# Patient Record
Sex: FEMALE | Race: WHITE | Employment: OTHER | URBAN - METROPOLITAN AREA
[De-identification: names, ages, dates, MRNs, and addresses within clinical notes are randomized per-mention and may not be internally consistent; named-entity substitution may affect disease eponyms.]

---

## 2024-06-27 ENCOUNTER — HOSPITAL ENCOUNTER (INPATIENT)
Age: 69
LOS: 4 days | Discharge: HOME | DRG: 326 | End: 2024-07-02
Attending: EMERGENCY MEDICINE | Admitting: STUDENT IN AN ORGANIZED HEALTH CARE EDUCATION/TRAINING PROGRAM
Payer: MEDICARE

## 2024-06-27 ENCOUNTER — APPOINTMENT (OUTPATIENT)
Dept: GENERAL RADIOLOGY | Age: 69
DRG: 326 | End: 2024-06-27
Payer: MEDICARE

## 2024-06-27 ENCOUNTER — APPOINTMENT (OUTPATIENT)
Dept: CT IMAGING | Age: 69
DRG: 326 | End: 2024-06-27
Payer: MEDICARE

## 2024-06-27 DIAGNOSIS — K92.2 GASTROINTESTINAL HEMORRHAGE, UNSPECIFIED GASTROINTESTINAL HEMORRHAGE TYPE: ICD-10-CM

## 2024-06-27 DIAGNOSIS — R10.9 ABDOMINAL PAIN, UNSPECIFIED ABDOMINAL LOCATION: ICD-10-CM

## 2024-06-27 DIAGNOSIS — E87.20 METABOLIC ACIDOSIS: ICD-10-CM

## 2024-06-27 DIAGNOSIS — I48.91 ATRIAL FIBRILLATION, UNSPECIFIED TYPE (HCC): Primary | ICD-10-CM

## 2024-06-27 LAB
ALBUMIN SERPL-MCNC: 3.8 G/DL (ref 3.5–5.2)
ALP SERPL-CCNC: 50 U/L (ref 35–104)
ALT SERPL-CCNC: 12 U/L (ref 0–32)
ANION GAP SERPL CALCULATED.3IONS-SCNC: 16 MMOL/L (ref 7–16)
AST SERPL-CCNC: 14 U/L (ref 0–31)
BASOPHILS # BLD: 0.05 K/UL (ref 0–0.2)
BASOPHILS NFR BLD: 0 % (ref 0–2)
BILIRUB SERPL-MCNC: 0.3 MG/DL (ref 0–1.2)
BUN SERPL-MCNC: 63 MG/DL (ref 6–23)
CALCIUM SERPL-MCNC: 8.6 MG/DL (ref 8.6–10.2)
CHLORIDE SERPL-SCNC: 106 MMOL/L (ref 98–107)
CO2 SERPL-SCNC: 20 MMOL/L (ref 22–29)
CREAT SERPL-MCNC: 1.3 MG/DL (ref 0.5–1)
EOSINOPHIL # BLD: 0.01 K/UL (ref 0.05–0.5)
EOSINOPHILS RELATIVE PERCENT: 0 % (ref 0–6)
ERYTHROCYTE [DISTWIDTH] IN BLOOD BY AUTOMATED COUNT: 18.2 % (ref 11.5–15)
GFR, ESTIMATED: 43 ML/MIN/1.73M2
GLUCOSE BLD-MCNC: 121 MG/DL (ref 74–99)
GLUCOSE SERPL-MCNC: 123 MG/DL (ref 74–99)
HCT VFR BLD AUTO: 34.7 % (ref 34–48)
HGB BLD-MCNC: 10.1 G/DL (ref 11.5–15.5)
IMM GRANULOCYTES # BLD AUTO: 0.05 K/UL (ref 0–0.58)
IMM GRANULOCYTES NFR BLD: 0 % (ref 0–5)
INFLUENZA A BY PCR: NOT DETECTED
INFLUENZA B BY PCR: NOT DETECTED
LACTATE BLDV-SCNC: 1.6 MMOL/L (ref 0.5–1.9)
LACTATE BLDV-SCNC: 2.8 MMOL/L (ref 0.5–1.9)
LIPASE SERPL-CCNC: 14 U/L (ref 13–60)
LYMPHOCYTES NFR BLD: 1.62 K/UL (ref 1.5–4)
LYMPHOCYTES RELATIVE PERCENT: 14 % (ref 20–42)
MAGNESIUM SERPL-MCNC: 1.9 MG/DL (ref 1.6–2.6)
MCH RBC QN AUTO: 25.5 PG (ref 26–35)
MCHC RBC AUTO-ENTMCNC: 29.1 G/DL (ref 32–34.5)
MCV RBC AUTO: 87.6 FL (ref 80–99.9)
MONOCYTES NFR BLD: 0.59 K/UL (ref 0.1–0.95)
MONOCYTES NFR BLD: 5 % (ref 2–12)
NEUTROPHILS NFR BLD: 80 % (ref 43–80)
NEUTS SEG NFR BLD: 9.05 K/UL (ref 1.8–7.3)
PHOSPHATE SERPL-MCNC: 4 MG/DL (ref 2.5–4.5)
PLATELET # BLD AUTO: 329 K/UL (ref 130–450)
PMV BLD AUTO: 11.2 FL (ref 7–12)
POTASSIUM SERPL-SCNC: 4 MMOL/L (ref 3.5–5)
PROCALCITONIN SERPL-MCNC: 0.1 NG/ML (ref 0–0.08)
PROT SERPL-MCNC: 6 G/DL (ref 6.4–8.3)
RBC # BLD AUTO: 3.96 M/UL (ref 3.5–5.5)
SARS-COV-2 RDRP RESP QL NAA+PROBE: NOT DETECTED
SODIUM SERPL-SCNC: 142 MMOL/L (ref 132–146)
SPECIMEN DESCRIPTION: NORMAL
TROPONIN I SERPL HS-MCNC: 15 NG/L (ref 0–9)
TROPONIN I SERPL HS-MCNC: 27 NG/L (ref 0–9)
WBC OTHER # BLD: 11.4 K/UL (ref 4.5–11.5)

## 2024-06-27 PROCEDURE — 80053 COMPREHEN METABOLIC PANEL: CPT

## 2024-06-27 PROCEDURE — 84484 ASSAY OF TROPONIN QUANT: CPT

## 2024-06-27 PROCEDURE — 2580000003 HC RX 258

## 2024-06-27 PROCEDURE — 6360000002 HC RX W HCPCS

## 2024-06-27 PROCEDURE — 87502 INFLUENZA DNA AMP PROBE: CPT

## 2024-06-27 PROCEDURE — 96375 TX/PRO/DX INJ NEW DRUG ADDON: CPT

## 2024-06-27 PROCEDURE — APPSS45 APP SPLIT SHARED TIME 31-45 MINUTES

## 2024-06-27 PROCEDURE — 71045 X-RAY EXAM CHEST 1 VIEW: CPT

## 2024-06-27 PROCEDURE — 84100 ASSAY OF PHOSPHORUS: CPT

## 2024-06-27 PROCEDURE — 83735 ASSAY OF MAGNESIUM: CPT

## 2024-06-27 PROCEDURE — 87040 BLOOD CULTURE FOR BACTERIA: CPT

## 2024-06-27 PROCEDURE — 93005 ELECTROCARDIOGRAM TRACING: CPT

## 2024-06-27 PROCEDURE — 83690 ASSAY OF LIPASE: CPT

## 2024-06-27 PROCEDURE — 82962 GLUCOSE BLOOD TEST: CPT

## 2024-06-27 PROCEDURE — 85025 COMPLETE CBC W/AUTO DIFF WBC: CPT

## 2024-06-27 PROCEDURE — 99285 EMERGENCY DEPT VISIT HI MDM: CPT

## 2024-06-27 PROCEDURE — 96365 THER/PROPH/DIAG IV INF INIT: CPT

## 2024-06-27 PROCEDURE — 83605 ASSAY OF LACTIC ACID: CPT

## 2024-06-27 PROCEDURE — 74177 CT ABD & PELVIS W/CONTRAST: CPT

## 2024-06-27 PROCEDURE — 87635 SARS-COV-2 COVID-19 AMP PRB: CPT

## 2024-06-27 PROCEDURE — C9113 INJ PANTOPRAZOLE SODIUM, VIA: HCPCS

## 2024-06-27 PROCEDURE — 84145 PROCALCITONIN (PCT): CPT

## 2024-06-27 PROCEDURE — 2500000003 HC RX 250 WO HCPCS

## 2024-06-27 PROCEDURE — 6360000004 HC RX CONTRAST MEDICATION: Performed by: RADIOLOGY

## 2024-06-27 RX ORDER — DILTIAZEM HYDROCHLORIDE 5 MG/ML
0.25 INJECTION INTRAVENOUS ONCE
Status: DISCONTINUED | OUTPATIENT
Start: 2024-06-27 | End: 2024-06-27

## 2024-06-27 RX ORDER — DILTIAZEM HYDROCHLORIDE 5 MG/ML
10 INJECTION INTRAVENOUS ONCE
Status: COMPLETED | OUTPATIENT
Start: 2024-06-27 | End: 2024-06-27

## 2024-06-27 RX ORDER — PANTOPRAZOLE SODIUM 40 MG/10ML
80 INJECTION, POWDER, LYOPHILIZED, FOR SOLUTION INTRAVENOUS ONCE
Status: COMPLETED | OUTPATIENT
Start: 2024-06-27 | End: 2024-06-27

## 2024-06-27 RX ORDER — 0.9 % SODIUM CHLORIDE 0.9 %
30 INTRAVENOUS SOLUTION INTRAVENOUS ONCE
Status: COMPLETED | OUTPATIENT
Start: 2024-06-27 | End: 2024-06-27

## 2024-06-27 RX ADMIN — SODIUM CHLORIDE 2.5 MG/HR: 900 INJECTION, SOLUTION INTRAVENOUS at 22:22

## 2024-06-27 RX ADMIN — DILTIAZEM HYDROCHLORIDE 10 MG: 5 INJECTION, SOLUTION INTRAVENOUS at 22:20

## 2024-06-27 RX ADMIN — SODIUM CHLORIDE 1605 ML: 9 INJECTION, SOLUTION INTRAVENOUS at 20:38

## 2024-06-27 RX ADMIN — PIPERACILLIN AND TAZOBACTAM 4500 MG: 4; .5 INJECTION, POWDER, LYOPHILIZED, FOR SOLUTION INTRAVENOUS at 23:33

## 2024-06-27 RX ADMIN — IOPAMIDOL 75 ML: 755 INJECTION, SOLUTION INTRAVENOUS at 21:35

## 2024-06-27 RX ADMIN — PANTOPRAZOLE SODIUM 80 MG: 40 INJECTION, POWDER, FOR SOLUTION INTRAVENOUS at 20:48

## 2024-06-27 ASSESSMENT — LIFESTYLE VARIABLES
HOW OFTEN DO YOU HAVE A DRINK CONTAINING ALCOHOL: NEVER
HOW MANY STANDARD DRINKS CONTAINING ALCOHOL DO YOU HAVE ON A TYPICAL DAY: PATIENT DOES NOT DRINK

## 2024-06-27 ASSESSMENT — PAIN - FUNCTIONAL ASSESSMENT: PAIN_FUNCTIONAL_ASSESSMENT: NONE - DENIES PAIN

## 2024-06-28 ENCOUNTER — APPOINTMENT (OUTPATIENT)
Age: 69
DRG: 326 | End: 2024-06-28
Payer: MEDICARE

## 2024-06-28 ENCOUNTER — ANESTHESIA (OUTPATIENT)
Dept: ENDOSCOPY | Age: 69
End: 2024-06-28
Payer: COMMERCIAL

## 2024-06-28 ENCOUNTER — ANESTHESIA EVENT (OUTPATIENT)
Dept: ENDOSCOPY | Age: 69
End: 2024-06-28
Payer: COMMERCIAL

## 2024-06-28 PROBLEM — I48.91 ATRIAL FIBRILLATION WITH RAPID VENTRICULAR RESPONSE (HCC): Status: ACTIVE | Noted: 2024-06-28

## 2024-06-28 PROBLEM — K92.2 GASTROINTESTINAL HEMORRHAGE: Status: ACTIVE | Noted: 2024-06-28

## 2024-06-28 PROBLEM — I95.9 HYPOTENSION: Status: ACTIVE | Noted: 2024-06-28

## 2024-06-28 PROBLEM — D62 ACUTE BLOOD LOSS ANEMIA: Status: ACTIVE | Noted: 2024-06-28

## 2024-06-28 PROBLEM — E87.20 LACTIC ACIDOSIS: Status: ACTIVE | Noted: 2024-06-28

## 2024-06-28 LAB
ALBUMIN SERPL-MCNC: 3.4 G/DL (ref 3.5–5.2)
ALP SERPL-CCNC: 40 U/L (ref 35–104)
ALT SERPL-CCNC: 10 U/L (ref 0–32)
ANION GAP SERPL CALCULATED.3IONS-SCNC: 10 MMOL/L (ref 7–16)
AST SERPL-CCNC: 13 U/L (ref 0–31)
BACTERIA URNS QL MICRO: ABNORMAL
BILIRUB SERPL-MCNC: 0.2 MG/DL (ref 0–1.2)
BILIRUB UR QL STRIP: NEGATIVE
BUN SERPL-MCNC: 54 MG/DL (ref 6–23)
CA-I BLD-SCNC: 1.09 MMOL/L (ref 1.15–1.33)
CALCIUM SERPL-MCNC: 7.3 MG/DL (ref 8.6–10.2)
CHLORIDE SERPL-SCNC: 116 MMOL/L (ref 98–107)
CLARITY UR: CLEAR
CO2 SERPL-SCNC: 19 MMOL/L (ref 22–29)
COLOR UR: YELLOW
CREAT SERPL-MCNC: 1.1 MG/DL (ref 0.5–1)
ERYTHROCYTE [DISTWIDTH] IN BLOOD BY AUTOMATED COUNT: 18.4 % (ref 11.5–15)
GFR, ESTIMATED: 56 ML/MIN/1.73M2
GLUCOSE SERPL-MCNC: 120 MG/DL (ref 74–99)
GLUCOSE UR STRIP-MCNC: NEGATIVE MG/DL
HCT VFR BLD AUTO: 24.5 % (ref 34–48)
HCT VFR BLD AUTO: 25.4 % (ref 34–48)
HCT VFR BLD AUTO: 32.5 % (ref 34–48)
HGB BLD-MCNC: 10 G/DL (ref 11.5–15.5)
HGB BLD-MCNC: 7.2 G/DL (ref 11.5–15.5)
HGB BLD-MCNC: 7.5 G/DL (ref 11.5–15.5)
HGB BLD-MCNC: 8.9 G/DL (ref 11.5–15.5)
HGB UR QL STRIP.AUTO: ABNORMAL
INR PPP: 1.3
KETONES UR STRIP-MCNC: NEGATIVE MG/DL
LACTATE BLDV-SCNC: 1.7 MMOL/L (ref 0.5–2.2)
LEUKOCYTE ESTERASE UR QL STRIP: NEGATIVE
MAGNESIUM SERPL-MCNC: 1.5 MG/DL (ref 1.6–2.6)
MAGNESIUM SERPL-MCNC: 1.6 MG/DL (ref 1.6–2.6)
MAGNESIUM SERPL-MCNC: 2.1 MG/DL (ref 1.6–2.6)
MCH RBC QN AUTO: 25.5 PG (ref 26–35)
MCHC RBC AUTO-ENTMCNC: 29.4 G/DL (ref 32–34.5)
MCV RBC AUTO: 86.9 FL (ref 80–99.9)
NITRITE UR QL STRIP: NEGATIVE
PARTIAL THROMBOPLASTIN TIME: 19.5 SEC (ref 24.5–35.1)
PH UR STRIP: 5.5 [PH] (ref 5–9)
PHOSPHATE SERPL-MCNC: 2.7 MG/DL (ref 2.5–4.5)
PLATELET # BLD AUTO: 248 K/UL (ref 130–450)
PMV BLD AUTO: 11.6 FL (ref 7–12)
POTASSIUM SERPL-SCNC: 3.5 MMOL/L (ref 3.5–5)
POTASSIUM SERPL-SCNC: 4.6 MMOL/L (ref 3.5–5)
PROT SERPL-MCNC: 4.8 G/DL (ref 6.4–8.3)
PROT UR STRIP-MCNC: NEGATIVE MG/DL
PROTHROMBIN TIME: 13.9 SEC (ref 9.3–12.4)
RBC # BLD AUTO: 2.82 M/UL (ref 3.5–5.5)
RBC #/AREA URNS HPF: ABNORMAL /HPF
SODIUM SERPL-SCNC: 145 MMOL/L (ref 132–146)
SP GR UR STRIP: 1.01 (ref 1–1.03)
T4 FREE SERPL-MCNC: 1.2 NG/DL (ref 0.9–1.7)
TROPONIN I SERPL HS-MCNC: 26 NG/L (ref 0–9)
TSH SERPL DL<=0.05 MIU/L-ACNC: 0.37 UIU/ML (ref 0.27–4.2)
UROBILINOGEN UR STRIP-ACNC: 0.2 EU/DL (ref 0–1)
WBC #/AREA URNS HPF: ABNORMAL /HPF
WBC OTHER # BLD: 11.2 K/UL (ref 4.5–11.5)

## 2024-06-28 PROCEDURE — 85018 HEMOGLOBIN: CPT

## 2024-06-28 PROCEDURE — 6360000002 HC RX W HCPCS: Performed by: NURSE ANESTHETIST, CERTIFIED REGISTERED

## 2024-06-28 PROCEDURE — 2580000003 HC RX 258

## 2024-06-28 PROCEDURE — 3700000001 HC ADD 15 MINUTES (ANESTHESIA): Performed by: INTERNAL MEDICINE

## 2024-06-28 PROCEDURE — 84132 ASSAY OF SERUM POTASSIUM: CPT

## 2024-06-28 PROCEDURE — 84484 ASSAY OF TROPONIN QUANT: CPT

## 2024-06-28 PROCEDURE — 2580000003 HC RX 258: Performed by: NURSE ANESTHETIST, CERTIFIED REGISTERED

## 2024-06-28 PROCEDURE — 30233N1 TRANSFUSION OF NONAUTOLOGOUS RED BLOOD CELLS INTO PERIPHERAL VEIN, PERCUTANEOUS APPROACH: ICD-10-PCS | Performed by: INTERNAL MEDICINE

## 2024-06-28 PROCEDURE — 80053 COMPREHEN METABOLIC PANEL: CPT

## 2024-06-28 PROCEDURE — 6360000002 HC RX W HCPCS

## 2024-06-28 PROCEDURE — 85027 COMPLETE CBC AUTOMATED: CPT

## 2024-06-28 PROCEDURE — 2720000010 HC SURG SUPPLY STERILE: Performed by: INTERNAL MEDICINE

## 2024-06-28 PROCEDURE — 86901 BLOOD TYPING SEROLOGIC RH(D): CPT

## 2024-06-28 PROCEDURE — 86850 RBC ANTIBODY SCREEN: CPT

## 2024-06-28 PROCEDURE — 85610 PROTHROMBIN TIME: CPT

## 2024-06-28 PROCEDURE — 36415 COLL VENOUS BLD VENIPUNCTURE: CPT

## 2024-06-28 PROCEDURE — 84443 ASSAY THYROID STIM HORMONE: CPT

## 2024-06-28 PROCEDURE — 2580000003 HC RX 258: Performed by: HOSPITALIST

## 2024-06-28 PROCEDURE — 3E0G8GC INTRODUCTION OF OTHER THERAPEUTIC SUBSTANCE INTO UPPER GI, VIA NATURAL OR ARTIFICIAL OPENING ENDOSCOPIC: ICD-10-PCS | Performed by: INTERNAL MEDICINE

## 2024-06-28 PROCEDURE — 85730 THROMBOPLASTIN TIME PARTIAL: CPT

## 2024-06-28 PROCEDURE — 3609013000 HC EGD TRANSORAL CONTROL BLEEDING ANY METHOD: Performed by: INTERNAL MEDICINE

## 2024-06-28 PROCEDURE — 6360000002 HC RX W HCPCS: Performed by: HOSPITALIST

## 2024-06-28 PROCEDURE — 83735 ASSAY OF MAGNESIUM: CPT

## 2024-06-28 PROCEDURE — 6370000000 HC RX 637 (ALT 250 FOR IP)

## 2024-06-28 PROCEDURE — 2709999900 HC NON-CHARGEABLE SUPPLY: Performed by: INTERNAL MEDICINE

## 2024-06-28 PROCEDURE — 83605 ASSAY OF LACTIC ACID: CPT

## 2024-06-28 PROCEDURE — 6360000002 HC RX W HCPCS: Performed by: INTERNAL MEDICINE

## 2024-06-28 PROCEDURE — 0W3P8ZZ CONTROL BLEEDING IN GASTROINTESTINAL TRACT, VIA NATURAL OR ARTIFICIAL OPENING ENDOSCOPIC: ICD-10-PCS | Performed by: INTERNAL MEDICINE

## 2024-06-28 PROCEDURE — 85014 HEMATOCRIT: CPT

## 2024-06-28 PROCEDURE — 36430 TRANSFUSION BLD/BLD COMPNT: CPT

## 2024-06-28 PROCEDURE — C9113 INJ PANTOPRAZOLE SODIUM, VIA: HCPCS | Performed by: HOSPITALIST

## 2024-06-28 PROCEDURE — 0D968ZZ DRAINAGE OF STOMACH, VIA NATURAL OR ARTIFICIAL OPENING ENDOSCOPIC: ICD-10-PCS | Performed by: INTERNAL MEDICINE

## 2024-06-28 PROCEDURE — 2000000000 HC ICU R&B

## 2024-06-28 PROCEDURE — 87081 CULTURE SCREEN ONLY: CPT

## 2024-06-28 PROCEDURE — 81001 URINALYSIS AUTO W/SCOPE: CPT

## 2024-06-28 PROCEDURE — 86900 BLOOD TYPING SEROLOGIC ABO: CPT

## 2024-06-28 PROCEDURE — 86923 COMPATIBILITY TEST ELECTRIC: CPT

## 2024-06-28 PROCEDURE — 2500000003 HC RX 250 WO HCPCS

## 2024-06-28 PROCEDURE — 99291 CRITICAL CARE FIRST HOUR: CPT | Performed by: STUDENT IN AN ORGANIZED HEALTH CARE EDUCATION/TRAINING PROGRAM

## 2024-06-28 PROCEDURE — 84439 ASSAY OF FREE THYROXINE: CPT

## 2024-06-28 PROCEDURE — 84100 ASSAY OF PHOSPHORUS: CPT

## 2024-06-28 PROCEDURE — P9016 RBC LEUKOCYTES REDUCED: HCPCS

## 2024-06-28 PROCEDURE — 82330 ASSAY OF CALCIUM: CPT

## 2024-06-28 PROCEDURE — 3700000000 HC ANESTHESIA ATTENDED CARE: Performed by: INTERNAL MEDICINE

## 2024-06-28 RX ORDER — PROCHLORPERAZINE EDISYLATE 5 MG/ML
10 INJECTION INTRAMUSCULAR; INTRAVENOUS ONCE
Status: COMPLETED | OUTPATIENT
Start: 2024-06-28 | End: 2024-06-28

## 2024-06-28 RX ORDER — PROPOFOL 10 MG/ML
INJECTION, EMULSION INTRAVENOUS PRN
Status: DISCONTINUED | OUTPATIENT
Start: 2024-06-28 | End: 2024-06-28 | Stop reason: SDUPTHER

## 2024-06-28 RX ORDER — SODIUM CHLORIDE 0.9 % (FLUSH) 0.9 %
5-40 SYRINGE (ML) INJECTION EVERY 12 HOURS SCHEDULED
Status: DISCONTINUED | OUTPATIENT
Start: 2024-06-28 | End: 2024-07-02 | Stop reason: HOSPADM

## 2024-06-28 RX ORDER — SODIUM CHLORIDE 9 MG/ML
INJECTION, SOLUTION INTRAVENOUS CONTINUOUS
Status: DISCONTINUED | OUTPATIENT
Start: 2024-06-28 | End: 2024-06-28

## 2024-06-28 RX ORDER — ONDANSETRON 2 MG/ML
4 INJECTION INTRAMUSCULAR; INTRAVENOUS EVERY 6 HOURS PRN
Status: DISCONTINUED | OUTPATIENT
Start: 2024-06-28 | End: 2024-07-02 | Stop reason: HOSPADM

## 2024-06-28 RX ORDER — 0.9 % SODIUM CHLORIDE 0.9 %
1000 INTRAVENOUS SOLUTION INTRAVENOUS ONCE
Status: COMPLETED | OUTPATIENT
Start: 2024-06-28 | End: 2024-06-28

## 2024-06-28 RX ORDER — LORAZEPAM 2 MG/ML
0.5 INJECTION INTRAMUSCULAR EVERY 6 HOURS PRN
Status: DISCONTINUED | OUTPATIENT
Start: 2024-06-28 | End: 2024-06-28

## 2024-06-28 RX ORDER — SODIUM CHLORIDE 9 MG/ML
INJECTION, SOLUTION INTRAVENOUS PRN
Status: DISCONTINUED | OUTPATIENT
Start: 2024-06-28 | End: 2024-06-29

## 2024-06-28 RX ORDER — SODIUM CHLORIDE 9 MG/ML
INJECTION, SOLUTION INTRAVENOUS CONTINUOUS PRN
Status: DISCONTINUED | OUTPATIENT
Start: 2024-06-28 | End: 2024-06-28 | Stop reason: SDUPTHER

## 2024-06-28 RX ORDER — NICOTINE 21 MG/24HR
1 PATCH, TRANSDERMAL 24 HOURS TRANSDERMAL DAILY
Status: DISCONTINUED | OUTPATIENT
Start: 2024-06-28 | End: 2024-07-02 | Stop reason: HOSPADM

## 2024-06-28 RX ORDER — FERROUS SULFATE 325(65) MG
325 TABLET ORAL
COMMUNITY

## 2024-06-28 RX ORDER — SODIUM CHLORIDE 9 MG/ML
INJECTION, SOLUTION INTRAVENOUS PRN
Status: DISCONTINUED | OUTPATIENT
Start: 2024-06-28 | End: 2024-07-02 | Stop reason: HOSPADM

## 2024-06-28 RX ORDER — SODIUM CHLORIDE 0.9 % (FLUSH) 0.9 %
5-40 SYRINGE (ML) INJECTION PRN
Status: DISCONTINUED | OUTPATIENT
Start: 2024-06-28 | End: 2024-07-02 | Stop reason: HOSPADM

## 2024-06-28 RX ORDER — SODIUM CHLORIDE, SODIUM LACTATE, POTASSIUM CHLORIDE, CALCIUM CHLORIDE 600; 310; 30; 20 MG/100ML; MG/100ML; MG/100ML; MG/100ML
INJECTION, SOLUTION INTRAVENOUS CONTINUOUS
Status: DISCONTINUED | OUTPATIENT
Start: 2024-06-28 | End: 2024-07-02

## 2024-06-28 RX ORDER — MAGNESIUM SULFATE IN WATER 40 MG/ML
2000 INJECTION, SOLUTION INTRAVENOUS ONCE
Status: COMPLETED | OUTPATIENT
Start: 2024-06-28 | End: 2024-06-28

## 2024-06-28 RX ORDER — POTASSIUM CHLORIDE 20 MEQ/1
40 TABLET, EXTENDED RELEASE ORAL ONCE
Status: COMPLETED | OUTPATIENT
Start: 2024-06-28 | End: 2024-06-28

## 2024-06-28 RX ORDER — PANTOPRAZOLE SODIUM 40 MG/1
40 TABLET, DELAYED RELEASE ORAL 2 TIMES DAILY
Status: ON HOLD | COMMUNITY
End: 2024-06-30 | Stop reason: HOSPADM

## 2024-06-28 RX ORDER — SERTRALINE HYDROCHLORIDE 100 MG/1
100 TABLET, FILM COATED ORAL DAILY
COMMUNITY

## 2024-06-28 RX ORDER — ACETAMINOPHEN 650 MG/1
650 SUPPOSITORY RECTAL EVERY 6 HOURS PRN
Status: DISCONTINUED | OUTPATIENT
Start: 2024-06-28 | End: 2024-07-02 | Stop reason: HOSPADM

## 2024-06-28 RX ORDER — ONDANSETRON 4 MG/1
4 TABLET, ORALLY DISINTEGRATING ORAL EVERY 8 HOURS PRN
COMMUNITY

## 2024-06-28 RX ORDER — DIGOXIN 0.25 MG/ML
125 INJECTION INTRAMUSCULAR; INTRAVENOUS DAILY
Status: DISCONTINUED | OUTPATIENT
Start: 2024-06-28 | End: 2024-06-28

## 2024-06-28 RX ORDER — PROCHLORPERAZINE EDISYLATE 5 MG/ML
INJECTION INTRAMUSCULAR; INTRAVENOUS
Status: COMPLETED
Start: 2024-06-28 | End: 2024-06-28

## 2024-06-28 RX ORDER — EPINEPHRINE 1 MG/ML
INJECTION, SOLUTION, CONCENTRATE INTRAVENOUS PRN
Status: DISCONTINUED | OUTPATIENT
Start: 2024-06-28 | End: 2024-06-28 | Stop reason: ALTCHOICE

## 2024-06-28 RX ORDER — ONDANSETRON 4 MG/1
4 TABLET, ORALLY DISINTEGRATING ORAL EVERY 8 HOURS PRN
Status: DISCONTINUED | OUTPATIENT
Start: 2024-06-28 | End: 2024-07-02 | Stop reason: HOSPADM

## 2024-06-28 RX ORDER — ACETAMINOPHEN 325 MG/1
650 TABLET ORAL EVERY 6 HOURS PRN
Status: DISCONTINUED | OUTPATIENT
Start: 2024-06-28 | End: 2024-07-02 | Stop reason: HOSPADM

## 2024-06-28 RX ORDER — LORAZEPAM 2 MG/ML
1 INJECTION INTRAMUSCULAR EVERY 6 HOURS PRN
Status: DISPENSED | OUTPATIENT
Start: 2024-06-28 | End: 2024-06-29

## 2024-06-28 RX ORDER — POTASSIUM CHLORIDE 7.45 MG/ML
10 INJECTION INTRAVENOUS ONCE
Status: COMPLETED | OUTPATIENT
Start: 2024-06-28 | End: 2024-06-28

## 2024-06-28 RX ORDER — CALCIUM GLUCONATE 20 MG/ML
1000 INJECTION, SOLUTION INTRAVENOUS ONCE
Status: COMPLETED | OUTPATIENT
Start: 2024-06-28 | End: 2024-06-28

## 2024-06-28 RX ADMIN — SODIUM CHLORIDE: 9 INJECTION, SOLUTION INTRAVENOUS at 14:17

## 2024-06-28 RX ADMIN — LORAZEPAM 1 MG: 2 INJECTION INTRAMUSCULAR; INTRAVENOUS at 10:09

## 2024-06-28 RX ADMIN — PHENYLEPHRINE HYDROCHLORIDE 100 MCG: 10 INJECTION INTRAVENOUS at 14:33

## 2024-06-28 RX ADMIN — PIPERACILLIN AND TAZOBACTAM 3375 MG: 3; .375 INJECTION, POWDER, LYOPHILIZED, FOR SOLUTION INTRAVENOUS at 09:57

## 2024-06-28 RX ADMIN — SODIUM CHLORIDE, POTASSIUM CHLORIDE, SODIUM LACTATE AND CALCIUM CHLORIDE: 600; 310; 30; 20 INJECTION, SOLUTION INTRAVENOUS at 05:17

## 2024-06-28 RX ADMIN — SODIUM CHLORIDE, PRESERVATIVE FREE 40 MG: 5 INJECTION INTRAVENOUS at 14:13

## 2024-06-28 RX ADMIN — AMIODARONE HYDROCHLORIDE 150 MG: 50 INJECTION, SOLUTION INTRAVENOUS at 01:38

## 2024-06-28 RX ADMIN — AMIODARONE HYDROCHLORIDE 0.5 MG/MIN: 50 INJECTION, SOLUTION INTRAVENOUS at 07:34

## 2024-06-28 RX ADMIN — SODIUM CHLORIDE, PRESERVATIVE FREE 40 MG: 5 INJECTION INTRAVENOUS at 09:06

## 2024-06-28 RX ADMIN — POTASSIUM CHLORIDE 40 MEQ: 1500 TABLET, EXTENDED RELEASE ORAL at 05:36

## 2024-06-28 RX ADMIN — PROCHLORPERAZINE EDISYLATE 10 MG: 5 INJECTION INTRAMUSCULAR; INTRAVENOUS at 07:52

## 2024-06-28 RX ADMIN — CALCIUM GLUCONATE 1000 MG: 20 INJECTION, SOLUTION INTRAVENOUS at 05:26

## 2024-06-28 RX ADMIN — MAGNESIUM SULFATE HEPTAHYDRATE 2000 MG: 40 INJECTION, SOLUTION INTRAVENOUS at 05:22

## 2024-06-28 RX ADMIN — POTASSIUM CHLORIDE 10 MEQ: 7.46 INJECTION, SOLUTION INTRAVENOUS at 05:20

## 2024-06-28 RX ADMIN — SODIUM CHLORIDE, POTASSIUM CHLORIDE, SODIUM LACTATE AND CALCIUM CHLORIDE: 600; 310; 30; 20 INJECTION, SOLUTION INTRAVENOUS at 15:58

## 2024-06-28 RX ADMIN — SODIUM CHLORIDE, PRESERVATIVE FREE 40 MG: 5 INJECTION INTRAVENOUS at 19:40

## 2024-06-28 RX ADMIN — AMIODARONE HYDROCHLORIDE 1 MG/MIN: 50 INJECTION, SOLUTION INTRAVENOUS at 02:06

## 2024-06-28 RX ADMIN — PROPOFOL 300 MG: 10 INJECTION, EMULSION INTRAVENOUS at 14:31

## 2024-06-28 RX ADMIN — SODIUM CHLORIDE 1000 ML: 9 INJECTION, SOLUTION INTRAVENOUS at 01:39

## 2024-06-28 RX ADMIN — AMIODARONE HYDROCHLORIDE 0.5 MG/MIN: 50 INJECTION, SOLUTION INTRAVENOUS at 21:01

## 2024-06-28 RX ADMIN — SODIUM CHLORIDE: 9 INJECTION, SOLUTION INTRAVENOUS at 01:38

## 2024-06-28 RX ADMIN — ONDANSETRON 4 MG: 2 INJECTION INTRAMUSCULAR; INTRAVENOUS at 05:27

## 2024-06-28 RX ADMIN — LORAZEPAM 0.5 MG: 2 INJECTION INTRAMUSCULAR; INTRAVENOUS at 09:20

## 2024-06-28 ASSESSMENT — PAIN SCALES - GENERAL
PAINLEVEL_OUTOF10: 0

## 2024-06-28 ASSESSMENT — LIFESTYLE VARIABLES: SMOKING_STATUS: 1

## 2024-06-28 NOTE — ED PROVIDER NOTES
[facility] EMERGENCY DEPARTMENT  EMERGENCY DEPARTMENT ENCOUNTER      Pt Name: Lizette Menendez  MRN: [id number]  Birthdate 1955  Date of evaluation: 6/27/2024  Provider: Gianluca Avilez MD  PCP: Dann Prescott MD  Note Started: 1:05 AM EDT 6/28/24    CHIEF COMPLAINT       Chief Complaint   Patient presents with    Abdominal Pain     Had ulcers cauterized yesterday. C/o abd pain with n/v and blood in stools.     Fatigue       HISTORY OF PRESENT ILLNESS: 1 or more Elements   History From: Patient  Limitations to history : None    Lizette Menendez is a 69 y.o. female who presents with complaints of fatigue, generalized weakness and complaints of generalized abdominal pain associated with reports of hematemesis and loose bloody stools.  Per patient, she is status post endoscopy yesterday as outpatient with Dr. Corley.  She reports she had bleeding ulcers cauterized yesterday.  There is currently no information in the patient's chart to provide collateral history.  Patient reports that upon returning home she has had intermittent episodes of bloody emesis and bloody stools.  She reports she just feels weak.  Currently denies fevers, chills, chest pain or pressure, shortness of breath, dizziness, weakness, numbness, tingling in extremities, dysuria.    Nursing Notes were all reviewed and agreed with or any disagreements were addressed in the HPI.    REVIEW OF SYSTEMS :    Positives and Pertinent negatives as per HPI.     PAST MEDICAL HISTORY/Chronic Conditions Affecting Care    has no past medical history on file.     SURGICAL HISTORY   No past surgical history on file.    CURRENTMEDICATIONS       Previous Medications    No medications on file       ALLERGIES     Patient has no known allergies.    FAMILYHISTORY     No family history on file.     SOCIAL HISTORY          SCREENINGS        [place] Coma Scale  Eye Opening: Spontaneous  Best Verbal Response: Oriented  Best Motor Response:

## 2024-06-28 NOTE — H&P
[facility] History and Physical      CHIEF COMPLAINT: Hematemesis    History of Present Illness:  This is a 69-year-old female with past medical history of depression and PUD who presents to the ED with generalized abdominal pain and hematemesis.  Patient states she had an outpatient upper endoscopy done on Wednesday with Dr. Corley, at which time she had bleeding ulcers cauterized.  After discharge she states she has had multiple episodes of bloody emesis and black tarry stools.  Complains of generalized abdominal pain and fatigue.  Denies shortness of breath or chest pain. Denies anticoagulation.      GI was consulted in ED and the case was discussed by the ED physician.  Patient was treated with 80 mg IV Protonix and Zosyn 450 mg IV.  She also received 30 ml/kg NS bolus.  Patient became tachycardic while in ED and was noted to be in atrial fibrillation. This is new onset as patient denies any history of atrial fibrillation.  Patient was given 10 mg Cardizem bolus and started on Cardizem drip. Cardizem was titrated to 5 mg/hour, but patient became hypotensive and heart rate was not controlled at 130-160s.  Hemoglobin noted to be trending down to 7.5 from 10.1 on admission. Stat type and screen and 2 units PRBCs ordered.  Cardizem discontinued and amiodarone bolus and infusion ordered.  Critical care was consulted and case discussed by ED resident.  Patient will be admitted for further evaluation and treatment to intensive care unit.      Informant(s) for H&P: Patient and chart review    REVIEW OF SYSTEMS:  A comprehensive review of systems was negative except for: what is in the HPI      PMH:  No past medical history on file.    Surgical History:  No past surgical history on file.    Medications Prior to Admission:    Prior to Admission medications    Not on File       Allergies:    Patient has no known allergies.    Social History:        Family History:   family history is not on file.  discontinued due to hypotension.  Start amiodarone drip.  Cardiology consulted.  Echo in a.m.  Avoid anticoagulation due to GI bleed.  Acute anemia: Secondary to GI bleed.  Complains of melena and hematemesis.  S/p upper endoscopy on 6/26 with Dr. Corley.  GI consulted in ED.  Transfuse for hemoglobin < 7, 2 units PRBCs ordered for transfusion.  Protonix 40 mg IV every 12 hours.  Keep n.p.o.  Will continue Zosyn due to recent instrumentation.  Lactic acidosis: Lactic acid on admission was 2.8.  Received 30 mL/kg bolus and repeat lactic acid was 1.6.  Continue IV hydration resolved.  Depression: Hold Zoloft.    Code Status: Full  DVT prophylaxis: SCDs    45 minutes or more spent reviewing patient chart, assessing patient, discussing plan of care with patient and family, discussing plan of care with collaborating physician, and documentation.    NOTE: This report was transcribed using voice recognition software. Every effort was made to ensure accuracy; however, inadvertent computerized transcription errors may be present.  Electronically signed by IRMA Masters CNP on 6/28/2024 at 1:52 AM

## 2024-06-28 NOTE — PATIENT CARE CONFERENCE
Patient admitted from ER 22 to room 207, placed on monitor, patient oriented to room and unit visiting hours.  Patient guide at bedside, reviewed patient rights and responsibilities. MRSA nasal swab obtained. Bed alarm on, call light within reach.     Cell phone, ankle jewelry, thumb ring, , undergarments, pants and shirt at bedside.

## 2024-06-28 NOTE — ED NOTES
Please contact for updates/questions/concerns:  Alfredo (Son): [phone]  Anastacia: [phone]

## 2024-06-28 NOTE — CONSULTS
CONSULT  Allan Hoffman M.D.  [facility]  Dr. Flaquita Hernandez M.D.,  Dr. Carlos Devlin M.D.,  Dr. Morris Corley, D.O.,  Dr. Andre Eisenberg D.O. ,  Dr. Davis Sloan M.D.,      Lizette Menendez  69 y.o.  female      Re:\"+FOBT, reported bloody emesis, s/p endoscopy yesterday\"  Requesting physician: Dr. Avilez, resident emergency department  Date:1:41 PM 6/28/2024      HPI: 69-year-old female patient seen in the hospital for above described issue.  Patient underwent upper endoscopy on Wednesday in our office.  Patient is very somnolent when I came to see her but her son is present at bedside and provides most of the information with additional information taken from medical records and discussion with healthcare team.  Apparently patient came to his house and stayed for some time recovering from the procedure.  Apparently patient had episode of nausea vomiting-according to the son patient had some blood in the emesis when she told him about.  Apparently patient went home and continued to have episodes of emesis and dark stool.  According to the son he is not aware if anybody was called with this information.  Apparently patient's daughter went to check on her yesterday and found her to be very fatigued prompting her to take her to the emergency department.  Patient was brought to the emergency department yesterday evening and found to be anemic with hemoglobin of 10.1 decreasing to 7.2 earlier today.  Patient is receiving blood transfusion today with repeat hemoglobin pending.    Information sources:   -Patient (limited/unobtainable)  -family (son at bedside)  -medical record  -health care team    PMHx:No past medical history on file.    PSHx:No past surgical history on file.    Meds:  Current Facility-Administered Medications   Medication Dose Route Frequency Provider Last Rate Last Admin    amiodarone (CORDARONE) 450 mg in dextrose 5 % 250 mL infusion  0.5 mg/min IntraVENous Continuous Marium Strange,  (ATIVAN) injection 1 mg  1 mg IntraVENous Q6H PRN Larry Gamez, DO   1 mg at 06/28/24 1009        SocHx:  Social History     Socioeconomic History    Marital status: Single     Spouse name: Not on file    Number of children: Not on file    Years of education: Not on file    Highest education level: Not on file   Occupational History    Not on file   Tobacco Use    Smoking status: Every Day     Current packs/day: 0.50     Average packs/day: 0.5 packs/day for 50.1 years (25.0 ttl pk-yrs)     Types: Cigarettes     Start date: 6/1/1974     Passive exposure: Current    Smokeless tobacco: Never   Substance and Sexual Activity    Alcohol use: Never    Drug use: Never    Sexual activity: Not on file   Other Topics Concern    Not on file   Social History Narrative    Not on file     Social Determinants of Health     Financial Resource Strain: Not on file   Food Insecurity: No Food Insecurity (6/28/2024)    Hunger Vital Sign     Worried About Running Out of Food in the Last Year: Never true     Ran Out of Food in the Last Year: Never true   Transportation Needs: No Transportation Needs (6/28/2024)    PRAPARE - Transportation     Lack of Transportation (Medical): No     Lack of Transportation (Non-Medical): No   Physical Activity: Not on file   Stress: Not on file   Social Connections: Not on file   Intimate Partner Violence: Not on file   Housing Stability: Low Risk  (6/28/2024)    Housing Stability Vital Sign     Unable to Pay for Housing in the Last Year: No     Number of Places Lived in the Last Year: 1     Unstable Housing in the Last Year: No       FamHx:No family history on file.    Allergy:No Known Allergies      ROS: As described in the HPI and in addition is negative upon detailed review of systems or unobtainable unless otherwise stated in this dictation.    PE:  BP (!) 140/66   Pulse 91   Temp 97.9 °F (36.6 °C) (Oral)   Resp 18   Ht 1.626 m (5' 4\")   Wt 53.5 kg (118 lb)   SpO2 100%   BMI 20.25 kg/m²      Gen.: NAD/adult  female.  Very somnolent/obtunded  Head: Atraumatic/normocephalic  Eyes: Anicteric sclera/no conjunctival erythema  ENT: Moist oral mucosa.  No discharge from nose or ears  Neck: Trachea midline/no JVD  Chest: CTAB/symmetric excursions  Cor: Regular/S1/S2  Abd.: Soft and not distended  Extr.:  No peripheral edema  Muscles: Tone and bulk, consistent with age and condition  Skin: Warm and dry.  Anicteric      DATA:     Lab Results   Component Value Date/Time    WBC 11.2 06/28/2024 03:44 AM    RBC 2.82 06/28/2024 03:44 AM    HGB 7.2 06/28/2024 03:44 AM    HCT 24.5 06/28/2024 03:44 AM    MCV 86.9 06/28/2024 03:44 AM    MCH 25.5 06/28/2024 03:44 AM    MCHC 29.4 06/28/2024 03:44 AM    RDW 18.4 06/28/2024 03:44 AM     06/28/2024 03:44 AM    MPV 11.6 06/28/2024 03:44 AM     Lab Results   Component Value Date/Time     06/28/2024 03:44 AM    K 4.6 06/28/2024 11:05 AM     06/28/2024 03:44 AM    CO2 19 06/28/2024 03:44 AM    BUN 54 06/28/2024 03:44 AM    CREATININE 1.1 06/28/2024 03:44 AM    CALCIUM 7.3 06/28/2024 03:44 AM    BILITOT 0.2 06/28/2024 03:44 AM    ALKPHOS 40 06/28/2024 03:44 AM    AST 13 06/28/2024 03:44 AM    ALT 10 06/28/2024 03:44 AM     Lab Results   Component Value Date/Time    LIPASE 14 06/27/2024 08:30 PM     No results found for: \"AMYLASE\"      ASSESSMENT/PLAN:  Patient Active Problem List   Diagnosis    Atrial fibrillation with rapid ventricular response (HCC)    Hypotension    Acute blood loss anemia    Lactic acidosis    Gastrointestinal hemorrhage     1.  Anemia/GI bleed  -Recent EGD with findings of gastric ulcer/AVM  -Bleeding starting shortly after procedure however patient/family did not seek medical attention until yesterday evening  -High-dose IV PPI  -Elevate head of bed; oxygen nasal cannula  -Type cross and hold 2 units PRBC  -Plan for further evaluation/treatment with upper endoscopy later today.    2.  Lung nodule  -Cannot exclude malignancy

## 2024-06-28 NOTE — ANESTHESIA POSTPROCEDURE EVALUATION
Department of Anesthesiology  Postprocedure Note    Patient: Lizette Menendez  MRN: [id number]  YOB: 1955  Date of evaluation: 6/28/2024    Procedure Summary       Date: 06/28/24 Room / Location: [facility]    Anesthesia Start: 1425 Anesthesia Stop: 1505    Procedure: ESOPHAGOGASTRODUODENOSCOPY Diagnosis:       Gastrointestinal hemorrhage, unspecified gastrointestinal hemorrhage type      (Gastrointestinal hemorrhage, unspecified gastrointestinal hemorrhage type [K92.2])    Surgeons: Morris Corley DO Responsible Provider: Angelica Beaulieu DO    Anesthesia Type: MAC ASA Status: 4            Anesthesia Type: No value filed.    Antonino Phase I:      Antonino Phase II:      Anesthesia Post Evaluation    Patient location during evaluation: ICU  Patient participation: complete - patient cannot participate  Level of consciousness: awake and obtunded/minimal responses  Airway patency: patent  Nausea & Vomiting: no nausea and no vomiting  Cardiovascular status: hemodynamically stable  Respiratory status: acceptable  Hydration status: euvolemic  Pain management: adequate        No notable events documented.

## 2024-06-28 NOTE — ED NOTES
ED to Inpatient Handoff Report    Notified Paddy that electronic handoff available and patient ready for transport to room 207.    Safety Risks: None identified    Patient in Restraints: no    Constant Observer or Patient : no    Telemetry Monitoring Ordered: Yes          Order to transfer to unit without monitor: NO    Last MEWS:  Time completed:     Deterioration Index: 33.1    Vitals:    06/28/24 0112 06/28/24 0119 06/28/24 0135 06/28/24 0145   BP:  (!) 88/52 (!) 93/58 105/71   Pulse: (!) 136 (!) 143 96 (!) 103   Resp:  22 18 11   Temp:       TempSrc:       SpO2:  96% 96% 99%   Weight:       Height:           Opportunity for questions and clarification was provided.

## 2024-06-28 NOTE — CONSULTS
Critical Care Admit/Consult Note     Patient - Lizette Menendez   MRN -  [id number]   [facility] # - [id number]   - 1955      Date of Admission -  2024  7:42 PM  Date of evaluation -  2024   Hospital Day - 0    Assessment and Plan  Ms. Lizette Menendez is a 69 y.o. female with the following medical problems:     Acute drop in hemoglobin, concerns for GI Bleed  S/p EGD with cauterization to ulcers on  with Dr. Corley  Atrial fibrillation with RVR, new onset  Hypotension, medication induced 2/2 Cardizem and hypovolemia (GI bleeding)  YANELIS?,no baseline creatinine, most likely volume responsive prerenal YANELIS  Lactic acidosis, resolved  Hyperchloremic metabolic acidosis  Lung nodule, right mid lobe  ---------------------------------------------------------------------------------------------------------  GI consulted, appreciate recommendations  Continue to trend H&H  Continue amiodarone infusion  Cardiology consulted, appreciate recommendations  Transfuse for hemoglobin <7  PPI BID  Continue with IVF, change to LR  Echo ordered by primary  Pan cultures pending, on zosyn per primary for concerns of intra abdominal infection, follow cultures and de-escalate antibiotics when appropriate  Continue to monitor renal function  Diet: NPO for now  GI prophylaxis: PPI  DVT prophylaxis: SCD's, no anticoagulation due to GI bleeding    History of Present Illness: Ms. Lizette Menendez is a 69 year old who was admitted to [facility] ICU on 2024 after presenting to the ED with reports of abdominal pain, nausea vomiting, blood in stool. The patient reports to having an EGD with Dr. Corley on Wednesday (). The patient reports to having bloody stools while at home and generalized weakness. While being evaluated in the ED, it was noted that she had an acute drop in hemoglobin 10.1>>7.2 and appears pale.  Other labs pertinent to this admission include: Creatinine 1.3, BUN 63, lactic acid 2.8, UA with trace bacteria.The  chills, night sweats.  HEENT: denies headaches, dizziness, head trauma, visual changes, eye pain, tinnitus, nosebleeds, hoarseness or throat pain    Respiratory: denies chest pain, dyspnea, cough and hemoptysis  Cardiovascular: denies orthopnea, paroxysmal nocturnal dyspnea, leg swelling, and previous heart attack.    Gastrointestinal: Reports melena, nausea and vomiting.  Genitourinary: denies hematuria, frequency, urgency or dysuria  Neurology: denies syncope, seizures, paralysis, paraesthesia   Endocrine: denies polyuria, polydipsia, skin or hair changes, and heat or cold intolerance  Musculoskeletal: Reports fatigue and generalized weakness  Hematologic: denies bleeding, adenopathy and easy bruising  Skin: denies rashes and skin discoloration  Psychiatry: denies depression    Physical Exam:   Vital Signs:  /71   Pulse 81   Temp 97.8 °F (36.6 °C) (Oral)   Resp 17   Ht 1.626 m (5' 4\")   Wt 53.5 kg (118 lb)   SpO2 100%   BMI 20.25 kg/m²     Input/Output:  No intake/output data recorded.    Oxygen requirements: room air         General appearance: Toxic appearing, not in pain or distress, in no respiratory distress    HEENT: Atraumatic/normocephalic, EOMI, ESTRELLA, pharynx clear, pale mucosa  Neck: Supple, no jugular venous distension, lymphadenopathy, thyromegaly or carotid bruits  Chest: Equal normal breath sounds, no wheezing, no crackles and no tenderness over ribs   Cardiovascular: Normal S1 , S2, irregular rate and rhythm, no murmur, rub or gallop  Abdomen: Normal sounds present, soft, lax with tenderness  Extremities: No edema. Pulses are equally present.   Skin: intact, no rashes   Neurologic: Alert and oriented x 3, No focal deficit, moving all extremities     Investigations:  Labs, radiological imaging and cardiac work up were personally reviewed      Case and plan was discussed with on call attending, Dr. Conley and rounding attending, Dr. Gamez.     Electronically signed by Jovana

## 2024-06-28 NOTE — CONSENT
Informed Consent for Blood Component Transfusion Note    I have discussed with the patient the rationale for blood component transfusion; its benefits in treating or preventing fatigue, organ damage, or death; and its risk which includes mild transfusion reactions, rare risk of blood borne infection, or more serious but rare reactions. I have discussed the alternatives to transfusion, including the risk and consequences of not receiving transfusion. The patient had an opportunity to ask questions and had agreed to proceed with transfusion of blood components.    Electronically signed by IRMA Dailey CNP on 6/28/24 at 2:46 AM EDT

## 2024-06-28 NOTE — PROGRESS NOTES
4 Eyes Skin Assessment     NAME:  Lizette Menendez  YOB: 1955  MEDICAL RECORD NUMBER:  [id number]    The patient is being assessed for  Admission    I agree that at least one RN has performed a thorough Head to Toe Skin Assessment on the patient. ALL assessment sites listed below have been assessed.      Areas assessed by both nurses:    Head, Face, Ears, Shoulders, Back, Chest, Arms, Elbows, Hands, Sacrum. Buttock, Coccyx, Ischium, Legs. Feet and Heels, and Under Medical Devices         Does the Patient have a Wound? No noted wound(s)       Narayan Prevention initiated by RN: Yes  Wound Care Orders initiated by RN: No    Pressure Injury (Stage 3,4, Unstageable, DTI, NWPT, and Complex wounds) if present, place Wound referral order by RN under : No    New Ostomies, if present place, Ostomy referral order under : No     Nurse 1 eSignature: Electronically signed by SANIA REYES RN on 6/28/24 at 3:21 AM EDT    **SHARE this note so that the co-signing nurse can place an eSignature**    Nurse 2 eSignature: Electronically signed by Daniella Chan RN on 6/28/24 at 3:27 AM EDT

## 2024-06-28 NOTE — PATIENT CARE CONFERENCE
Intensive Care Daily Quality Rounding Checklist      ICU Team Members:     ICU Day #: NUMBER: 1    SOFA Score:    Intubation Date:  N/A    Ventilator Day #: N/A    Central Line Insertion Date:  N/A        Day #:         Indication:      Arterial Line Insertion Date:  N/A      Day #:     Temporary Hemodialysis Catheter Insertion Date:  N/A      Day #     DVT Prophylaxis: N/A due to bleeding    GI Prophylaxis: Protonix q 6    Simmons Catheter Insertion Date:  N/A       Day #:       Indications:       Continued need (if yes, reason documented and discussed with physician):     Skin Issues/ Wounds and ordered treatment discussed on rounds: No issus, SOS precuations    Goals/ Plans for the Day:  Monitor labs and vitals, replace/transfuse as needed, continue amio gtt, continue critical care management.  Received 2 units of PRBC and follow hgb's, awaiting cardiology input, two units on hold if needed,     Reviewed plan and goals for day with patient and/or representative:

## 2024-06-28 NOTE — PROGRESS NOTES
Immediately prior to the procedure the patient's History and Physical was reviewed- there are no changes with the current vitals.  BP (!) 140/66   Pulse 91   Temp 97.9 °F (36.6 °C) (Oral)   Resp 18   Ht 1.626 m (5' 4\")   Wt 53.5 kg (118 lb)   SpO2 100%   BMI 20.25 kg/m²     No CP/SOB.  Risks/benefits d/w pt.  All questions answered.  Proceed with EGD.    Son in room and answers all questions prior too.    DESTINY GERMAN,   6/28/2024  2:57 PM

## 2024-06-28 NOTE — CONSULTS
[facility]    INPATIENT CARDIOLOGY CONSULT    Name: Lizette Menendez    Age: 69 y.o.    Date of Admission: 6/27/2024  7:42 PM    Date of Service: 6/28/2024    Reason for Consultation: re PAF    Referring Physician: Alie FOSTER  Primary Care Physician: Dann Prescott MD    History of Present Illness: The patient is a 69 y.o. year old female not known to [facility] presenting to ED 6/27/24 for abdominal pain, N/V, blood in stools. Had EGD 6/26/24 to cauterize bleeding ulcers. In ED originally was in sinus rhythm and developed afib RVR up 160's. [facility] cardiology was apparently called and suggested cardizem, then amiodarone gtt. She converted to sinus rhythm and admitted to ED. For unclear reasons [facility] consulted this am for the afib. Hg dropped to 7.2, transfused this am. Agitated and not able to give much history, daughter -in -law present and stated probably agitated due to not smoking. States does not drink ETOH. No history of DM, CVA, HTN,, CAD. States does seem to fall a lot at home, but no injuries.    Past Medical History:   No past medical history on file.    Review of Systems:   Cannot be obtained in current state    Family History:  No family history on file.    Social History:  Social History     Socioeconomic History    Marital status: Single     Spouse name: Not on file    Number of children: Not on file    Years of education: Not on file    Highest education level: Not on file   Occupational History    Not on file   Tobacco Use    Smoking status: Every Day     Current packs/day: 0.50     Average packs/day: 0.5 packs/day for 50.1 years (25.0 ttl pk-yrs)     Types: Cigarettes     Start date: 6/1/1974     Passive exposure: Current    Smokeless tobacco: Never   Substance and Sexual Activity    Alcohol use: Never    Drug use: Never    Sexual activity: Not on file   Other Topics Concern    Not on file   Social History Narrative    Not on file     Social Determinants of Health

## 2024-06-28 NOTE — ED NOTES
Patient in Afib RVR on monitor upon arrival back from CT. EKG obtained and given to Dr Knox and Resident. Liter of NS infusing at this time per resident verbal order.

## 2024-06-28 NOTE — OP NOTE
Operative Note      Patient: Lizette Menendez  YOB: 1955  MRN: [id number]    Date of Procedure: 6/28/2024    Pre-Op Diagnosis Codes:     * Gastrointestinal hemorrhage, unspecified gastrointestinal hemorrhage type [K92.2], PUD, Duodenal AVM    Post-Op Diagnosis: SAME       Procedure(s):  ESOPHAGOGASTRODUODENOSCOPY    Surgeon(s):  Morris Corley DO    Assistant:   * No surgical staff found *    Anesthesia: Monitor Anesthesia Care    Estimated Blood Loss (mL): ~400cc of old blood suctioned from stomach    Complications: None    Specimens:   * No specimens in log *    Implants:  * No implants in log *      Drains: * No LDAs found *    Detailed Description of Procedure:   Procedure:  Esophagogastroduodenoscopy    Indication:  GI Bleed, Anemia, PUD, Duodenal AVM    Consent: Informed consent was obtained from the patient including and not limited to risk of perforation, aspiration of gastric contents or teeth, bleeding, infection, dental breakage, ileus, need for surgery, or worst case death.    Sedation  MAC    Estimated Blood Loss -- ~400cc old blood suctioned from stomach    Endoscope was advanced easily through mouth to second portion of duodenum      Oropharynx views are limited but grossly normal.    Esophagus:   Mucosa is normal other than LA A Reflux Esophagitis, no bleeding or varices.  GEJ at ~38 cm.  Biopsy scar noted in mid esophagus with no bleeding.    Stomach:   Antrum with moderate gastritis, 2 small, 2-3mm erosion vs ulcer with no pigmented spot or vessel.  No bleeding on contact or with irrigation    Gastric body is normal other than moderate old blood and clot along greater curvature, this was suctioned and removed with no sign of active bleeding or pathology.    Retroflexed views showed normal fundus and cardia other than a 2cm linear ulcer in the cardia of stomach, distal to the GEJ by 3-4cm's.  There was active oozing at this ulceration with small vessel seen.  I injected 6cc of 1:10,000 Epi

## 2024-06-28 NOTE — PROGRESS NOTES
brought requested ACP documents and information sheet to patient and family to look over. Family expressed appreciation for bringing documents and will contact chaplains when/if wanting proceed with document completion.

## 2024-06-28 NOTE — CARE COORDINATION
Pt w/hematemesis w/new Afib w/RVR on amiodarone gtt, IVFs and IV zosyn and protonix. Cardiology and GI consulted. CM met w/family at bedside w/role of CM explained. Son Morris answered all questions as pt was resting. Morris states pt resides alone in a condo and is independent w/o the use of any assistive devices. She is established w/Dr. Prescott and uses [facility] for her medications. No home O2, cpap or nebulizer. No hx of HHC or SNF stay. Family denies any needs and will transport pt home upon discharge, will follow.  ROSS GanN, RN  [facility]  [phone]

## 2024-06-28 NOTE — ANESTHESIA PRE PROCEDURE
Department of Anesthesiology  Preprocedure Note       Name:  Lizette Menendez   Age:  69 y.o.  :  1955                                          MRN:  [id number]         Date:  2024      Surgeon: Surgeon(s):  Morris Corley DO    Procedure: Procedure(s):  ESOPHAGOGASTRODUODENOSCOPY    Medications prior to admission:   Prior to Admission medications    Medication Sig Start Date End Date Taking? Authorizing Provider   sertraline (ZOLOFT) 100 MG tablet Take 1 tablet by mouth daily   Yes ProviderMiriam MD   ondansetron (ZOFRAN-ODT) 4 MG disintegrating tablet Take 1 tablet by mouth every 8 hours as needed for Nausea or Vomiting   Yes ProviderMiriam MD   pantoprazole (PROTONIX) 40 MG tablet Take 1 tablet by mouth 2 times daily   Yes ProviderMiriam MD   ferrous sulfate (IRON 325) 325 (65 Fe) MG tablet Take 1 tablet by mouth daily (with breakfast)   Yes ProviderMiriam MD       Current medications:    Current Facility-Administered Medications   Medication Dose Route Frequency Provider Last Rate Last Admin    amiodarone (CORDARONE) 450 mg in dextrose 5 % 250 mL infusion  0.5 mg/min IntraVENous Continuous Marium Strange APRN - CNP 16.7 mL/hr at 24 0734 0.5 mg/min at 24 0734    0.9 % sodium chloride infusion   IntraVENous PRN Marium Strange APRN - CNP        sodium chloride flush 0.9 % injection 5-40 mL  5-40 mL IntraVENous 2 times per day Marium Strange APRN - CNP        sodium chloride flush 0.9 % injection 5-40 mL  5-40 mL IntraVENous PRN Marium Strange APRN - CNP        0.9 % sodium chloride infusion   IntraVENous PRN Marium Strange APRN - CNP        ondansetron (ZOFRAN-ODT) disintegrating tablet 4 mg  4 mg Oral Q8H PRN Marium Strange APRN - CNP        Or    ondansetron (ZOFRAN) injection 4 mg  4 mg IntraVENous Q6H PRN Marium Strange APRN - CNP   4 mg at 24 0527    acetaminophen (TYLENOL) tablet 650 mg  650 mg Oral Q6H PRN Alie,

## 2024-06-28 NOTE — PROGRESS NOTES
Comprehensive Nutrition Assessment    Type and Reason for Visit:  Initial, Positive Nutrition Screen    Nutrition Recommendations/Plan:   When medically feasible, advance diet as tolerated  Will advance ONS with diet progression  Continue inpatient monitoring     Malnutrition Assessment:  Malnutrition Status:  At risk for malnutrition (Comment) (06/28/24 0371)    Context:  Acute Illness     Findings of the 6 clinical characteristics of malnutrition:  Energy Intake:  50% or less of estimated energy requirements for 5 or more days  Weight Loss:  Unable to assess     Body Fat Loss:  Unable to assess     Muscle Mass Loss:  Unable to assess    Fluid Accumulation:  No significant fluid accumulation     Strength:  Not Performed    Nutrition Assessment:    Pt admit 2/2 Acute drop in hemoglobin w/ concerns for GIB  S/p EGD with cauterization to ulcers on 6/26 and new onset A-fib with RVR. Pt is currently NPO 6/28 for repeat EGD, as she had hematemesis and dark stools. Will continue to monitor pt GI status and POC.    Nutrition Related Findings:    Somnolent/fatigued, BUN/creat 54/1.1, soft abd +BS, nausea, I/O WNL, pressor (MAP 89) Wound Type: None       Current Nutrition Intake & Therapies:    Average Meal Intake: 1-25%, 0% (reported PTA)  Average Supplements Intake: None Ordered  ADULT DIET; Clear Liquid  ADULT ORAL NUTRITION SUPPLEMENT; Breakfast, Dinner; Clear Liquid Oral Supplement    Anthropometric Measures:  Height: 162.6 cm (5' 4\")  Ideal Body Weight (IBW): 120 lbs (55 kg)       Current Body Weight: 53.5 kg (117 lb 15.1 oz) (UTO CBW as pt currently in Endo 6/28), 98.3 % IBW. Weight Source: Stated (6/27)  Current BMI (kg/m2): 20.2  Usual Body Weight:  (IVAN d/t lack of actual wt hx in EMR)                            Estimated Daily Nutrient Needs:  Energy Requirements Based On: Formula  Weight Used for Energy Requirements: Current  Energy (kcal/day):   Weight Used for Protein Requirements: Current  Protein  (g/day): 55-65 (1.1-1.3 g/kg)  Method Used for Fluid Requirements: Other (Comment)  Fluid (ml/day): per Critical Care    Nutrition Diagnosis:   Inadequate oral intake related to altered GI function as evidenced by NPO or clear liquid status due to medical condition, poor intake prior to admission, GI abnormality    Nutrition Interventions:   Food and/or Nutrient Delivery: Continue Current Diet, Start Oral Nutrition Supplement (Ensure Clear BID)  Nutrition Education/Counseling: No recommendation at this time  Coordination of Nutrition Care: Continue to monitor while inpatient       Goals:     Goals: PO intake 50% or greater, by next RD assessment       Nutrition Monitoring and Evaluation:   Behavioral-Environmental Outcomes: None Identified  Food/Nutrient Intake Outcomes: Diet Advancement/Tolerance  Physical Signs/Symptoms Outcomes: Biochemical Data, GI Status, Fluid Status or Edema, Nutrition Focused Physical Findings, Weight, Skin    Discharge Planning:    Too soon to determine     Cyndi Leonardo RD, CNSC, LD  Contact: x [phone]

## 2024-06-28 NOTE — PROGRESS NOTES
SPIRITUAL HEALTH SERVICES - [facility] Encounter    Name: Lizette Menendez                  Referral: Routine Visit    Sacraments  Anointed (Last Rites): No  Apostolic Booker: No  Confession: No  Communion: No     Assessment:  Patient was not very responsive but family welcomed 's visit. Family said patient is Pres[employer].      Intervention:   offered prayer and blessing for patient's healing.      Outcome:  Patient expressed gratitude for visit.    Plan:  Chaplains will remain available to offer spiritual and emotional support as needed.      Electronically signed by Chaplain Cyn, on 6/28/2024 at 12:51 PM.  Spiritual Care Department  [facility]  [phone]

## 2024-06-28 NOTE — PLAN OF CARE
This is a 69 year old female with past medical history of depression, PUD presents with hematemesis.     New onset atrial fibrillation with RVR - Continue amiodarone. Hold anticoagulation. Cardiology on board.  Hypotension - Continue LR @ 125  Hematemesis - Possible thickening of the duodenum. Continue PPI IV q6, LR, and zosyn. GI on board.   Acute blood loss anemia S/P 2 units PRBC - Keep hemoglobin greater than 7  Lactic acidosis - Resolved

## 2024-06-29 ENCOUNTER — APPOINTMENT (OUTPATIENT)
Dept: ULTRASOUND IMAGING | Age: 69
DRG: 326 | End: 2024-06-29
Payer: MEDICARE

## 2024-06-29 ENCOUNTER — APPOINTMENT (OUTPATIENT)
Dept: CT IMAGING | Age: 69
DRG: 326 | End: 2024-06-29
Payer: MEDICARE

## 2024-06-29 LAB
ALBUMIN SERPL-MCNC: 3 G/DL (ref 3.5–5.2)
ALP SERPL-CCNC: 34 U/L (ref 35–104)
ALT SERPL-CCNC: 10 U/L (ref 0–32)
ANION GAP SERPL CALCULATED.3IONS-SCNC: 9 MMOL/L (ref 7–16)
AST SERPL-CCNC: 14 U/L (ref 0–31)
BILIRUB SERPL-MCNC: 0.5 MG/DL (ref 0–1.2)
BUN SERPL-MCNC: 22 MG/DL (ref 6–23)
CA-I BLD-SCNC: 1.01 MMOL/L (ref 1.15–1.33)
CA-I BLD-SCNC: 1.16 MMOL/L (ref 1.15–1.33)
CALCIUM SERPL-MCNC: 7.6 MG/DL (ref 8.6–10.2)
CHLORIDE SERPL-SCNC: 114 MMOL/L (ref 98–107)
CO2 SERPL-SCNC: 21 MMOL/L (ref 22–29)
CREAT SERPL-MCNC: 0.8 MG/DL (ref 0.5–1)
ERYTHROCYTE [DISTWIDTH] IN BLOOD BY AUTOMATED COUNT: 19.3 % (ref 11.5–15)
GFR, ESTIMATED: 81 ML/MIN/1.73M2
GLUCOSE SERPL-MCNC: 97 MG/DL (ref 74–99)
HCT VFR BLD AUTO: 20.7 % (ref 34–48)
HGB BLD-MCNC: 10.3 G/DL (ref 11.5–15.5)
HGB BLD-MCNC: 11 G/DL (ref 11.5–15.5)
HGB BLD-MCNC: 11.5 G/DL (ref 11.5–15.5)
HGB BLD-MCNC: 6.5 G/DL (ref 11.5–15.5)
HGB BLD-MCNC: NORMAL G/DL (ref 11.9–15.1)
HGB BLD-MCNC: NORMAL G/DL (ref 11.9–15.1)
MAGNESIUM SERPL-MCNC: 1.6 MG/DL (ref 1.6–2.6)
MAGNESIUM SERPL-MCNC: 2.1 MG/DL (ref 1.6–2.6)
MCH RBC QN AUTO: 28.3 PG (ref 26–35)
MCHC RBC AUTO-ENTMCNC: 31.4 G/DL (ref 32–34.5)
MCV RBC AUTO: 90 FL (ref 80–99.9)
MICROORGANISM SPEC CULT: NORMAL
PHOSPHATE SERPL-MCNC: 2.5 MG/DL (ref 2.5–4.5)
PLATELET CONFIRMATION: NORMAL
PLATELET, FLUORESCENCE: 98 K/UL (ref 130–450)
PMV BLD AUTO: 11.4 FL (ref 7–12)
POTASSIUM SERPL-SCNC: 3.2 MMOL/L (ref 3.5–5)
POTASSIUM SERPL-SCNC: 4.4 MMOL/L (ref 3.5–5)
POTASSIUM SERPL-SCNC: 5.7 MMOL/L (ref 3.5–5)
PROT SERPL-MCNC: 4.2 G/DL (ref 6.4–8.3)
RBC # BLD AUTO: 2.3 M/UL (ref 3.5–5.5)
SERVICE CMNT-IMP: NORMAL
SODIUM SERPL-SCNC: 144 MMOL/L (ref 132–146)
SPECIMEN DESCRIPTION: NORMAL
WBC OTHER # BLD: 8.4 K/UL (ref 4.5–11.5)

## 2024-06-29 PROCEDURE — 6370000000 HC RX 637 (ALT 250 FOR IP): Performed by: INTERNAL MEDICINE

## 2024-06-29 PROCEDURE — 99233 SBSQ HOSP IP/OBS HIGH 50: CPT | Performed by: INTERNAL MEDICINE

## 2024-06-29 PROCEDURE — 85027 COMPLETE CBC AUTOMATED: CPT

## 2024-06-29 PROCEDURE — 2580000003 HC RX 258

## 2024-06-29 PROCEDURE — 2580000003 HC RX 258: Performed by: INTERNAL MEDICINE

## 2024-06-29 PROCEDURE — 36430 TRANSFUSION BLD/BLD COMPNT: CPT

## 2024-06-29 PROCEDURE — 6360000002 HC RX W HCPCS: Performed by: HOSPITALIST

## 2024-06-29 PROCEDURE — 6370000000 HC RX 637 (ALT 250 FOR IP)

## 2024-06-29 PROCEDURE — C9113 INJ PANTOPRAZOLE SODIUM, VIA: HCPCS | Performed by: HOSPITALIST

## 2024-06-29 PROCEDURE — 36415 COLL VENOUS BLD VENIPUNCTURE: CPT

## 2024-06-29 PROCEDURE — 84100 ASSAY OF PHOSPHORUS: CPT

## 2024-06-29 PROCEDURE — 83735 ASSAY OF MAGNESIUM: CPT

## 2024-06-29 PROCEDURE — 93971 EXTREMITY STUDY: CPT

## 2024-06-29 PROCEDURE — 85018 HEMOGLOBIN: CPT

## 2024-06-29 PROCEDURE — 2580000003 HC RX 258: Performed by: HOSPITALIST

## 2024-06-29 PROCEDURE — 6360000002 HC RX W HCPCS

## 2024-06-29 PROCEDURE — 2500000003 HC RX 250 WO HCPCS

## 2024-06-29 PROCEDURE — 80053 COMPREHEN METABOLIC PANEL: CPT

## 2024-06-29 PROCEDURE — P9016 RBC LEUKOCYTES REDUCED: HCPCS

## 2024-06-29 PROCEDURE — 82330 ASSAY OF CALCIUM: CPT

## 2024-06-29 PROCEDURE — 84132 ASSAY OF SERUM POTASSIUM: CPT

## 2024-06-29 PROCEDURE — 2060000000 HC ICU INTERMEDIATE R&B

## 2024-06-29 PROCEDURE — 71250 CT THORAX DX C-: CPT

## 2024-06-29 PROCEDURE — 6360000002 HC RX W HCPCS: Performed by: INTERNAL MEDICINE

## 2024-06-29 RX ORDER — SODIUM CHLORIDE 9 MG/ML
INJECTION, SOLUTION INTRAVENOUS PRN
Status: DISCONTINUED | OUTPATIENT
Start: 2024-06-29 | End: 2024-06-29

## 2024-06-29 RX ORDER — POTASSIUM CHLORIDE 20 MEQ/1
40 TABLET, EXTENDED RELEASE ORAL ONCE
Status: COMPLETED | OUTPATIENT
Start: 2024-06-29 | End: 2024-06-29

## 2024-06-29 RX ORDER — SUCRALFATE 1 G/1
1 TABLET ORAL
Status: DISPENSED | OUTPATIENT
Start: 2024-06-29 | End: 2024-07-02

## 2024-06-29 RX ORDER — MAGNESIUM SULFATE IN WATER 40 MG/ML
2000 INJECTION, SOLUTION INTRAVENOUS ONCE
Status: COMPLETED | OUTPATIENT
Start: 2024-06-29 | End: 2024-06-29

## 2024-06-29 RX ORDER — LORAZEPAM 1 MG/1
1 TABLET ORAL EVERY 12 HOURS PRN
Status: DISCONTINUED | OUTPATIENT
Start: 2024-06-29 | End: 2024-07-02 | Stop reason: HOSPADM

## 2024-06-29 RX ORDER — CALCIUM GLUCONATE 20 MG/ML
1000 INJECTION, SOLUTION INTRAVENOUS ONCE
Status: COMPLETED | OUTPATIENT
Start: 2024-06-29 | End: 2024-06-29

## 2024-06-29 RX ORDER — CALCIUM CARBONATE 500 MG/1
TABLET, CHEWABLE ORAL
Status: COMPLETED
Start: 2024-06-29 | End: 2024-06-29

## 2024-06-29 RX ORDER — CALCIUM CARBONATE 500 MG/1
500 TABLET, CHEWABLE ORAL ONCE
Status: COMPLETED | OUTPATIENT
Start: 2024-06-29 | End: 2024-06-29

## 2024-06-29 RX ORDER — PROCHLORPERAZINE EDISYLATE 5 MG/ML
INJECTION INTRAMUSCULAR; INTRAVENOUS
Status: COMPLETED
Start: 2024-06-29 | End: 2024-06-29

## 2024-06-29 RX ORDER — METOPROLOL SUCCINATE 25 MG/1
25 TABLET, EXTENDED RELEASE ORAL 2 TIMES DAILY
Status: DISCONTINUED | OUTPATIENT
Start: 2024-06-29 | End: 2024-07-02 | Stop reason: HOSPADM

## 2024-06-29 RX ORDER — PROCHLORPERAZINE EDISYLATE 5 MG/ML
10 INJECTION INTRAMUSCULAR; INTRAVENOUS ONCE
Status: COMPLETED | OUTPATIENT
Start: 2024-06-29 | End: 2024-06-29

## 2024-06-29 RX ORDER — SERTRALINE HYDROCHLORIDE 100 MG/1
100 TABLET, FILM COATED ORAL DAILY
Status: DISCONTINUED | OUTPATIENT
Start: 2024-06-29 | End: 2024-07-02 | Stop reason: HOSPADM

## 2024-06-29 RX ADMIN — SUCRALFATE 1 G: 1 TABLET ORAL at 20:12

## 2024-06-29 RX ADMIN — MAGNESIUM SULFATE HEPTAHYDRATE 2000 MG: 40 INJECTION, SOLUTION INTRAVENOUS at 07:16

## 2024-06-29 RX ADMIN — PIPERACILLIN AND TAZOBACTAM 3375 MG: 3; .375 INJECTION, POWDER, LYOPHILIZED, FOR SOLUTION INTRAVENOUS at 00:06

## 2024-06-29 RX ADMIN — SUCRALFATE 1 G: 1 TABLET ORAL at 17:51

## 2024-06-29 RX ADMIN — SODIUM CHLORIDE, POTASSIUM CHLORIDE, SODIUM LACTATE AND CALCIUM CHLORIDE: 600; 310; 30; 20 INJECTION, SOLUTION INTRAVENOUS at 00:03

## 2024-06-29 RX ADMIN — SODIUM CHLORIDE, PRESERVATIVE FREE 40 MG: 5 INJECTION INTRAVENOUS at 14:04

## 2024-06-29 RX ADMIN — METOPROLOL SUCCINATE 25 MG: 25 TABLET, FILM COATED, EXTENDED RELEASE ORAL at 20:12

## 2024-06-29 RX ADMIN — SODIUM CHLORIDE, PRESERVATIVE FREE 40 MG: 5 INJECTION INTRAVENOUS at 20:11

## 2024-06-29 RX ADMIN — SODIUM CHLORIDE 100 MG: 9 INJECTION, SOLUTION INTRAVENOUS at 14:05

## 2024-06-29 RX ADMIN — PROCHLORPERAZINE EDISYLATE 10 MG: 5 INJECTION INTRAMUSCULAR; INTRAVENOUS at 07:54

## 2024-06-29 RX ADMIN — POTASSIUM CHLORIDE 40 MEQ: 1500 TABLET, EXTENDED RELEASE ORAL at 07:14

## 2024-06-29 RX ADMIN — PIPERACILLIN AND TAZOBACTAM 3375 MG: 3; .375 INJECTION, POWDER, LYOPHILIZED, FOR SOLUTION INTRAVENOUS at 08:23

## 2024-06-29 RX ADMIN — PIPERACILLIN AND TAZOBACTAM 3375 MG: 3; .375 INJECTION, POWDER, LYOPHILIZED, FOR SOLUTION INTRAVENOUS at 16:12

## 2024-06-29 RX ADMIN — SERTRALINE 100 MG: 100 TABLET, FILM COATED ORAL at 10:19

## 2024-06-29 RX ADMIN — SODIUM CHLORIDE, PRESERVATIVE FREE 10 ML: 5 INJECTION INTRAVENOUS at 10:25

## 2024-06-29 RX ADMIN — METOPROLOL SUCCINATE 25 MG: 25 TABLET, FILM COATED, EXTENDED RELEASE ORAL at 10:19

## 2024-06-29 RX ADMIN — SUCRALFATE 1 G: 1 TABLET ORAL at 10:19

## 2024-06-29 RX ADMIN — LORAZEPAM 1 MG: 1 TABLET ORAL at 21:25

## 2024-06-29 RX ADMIN — CALCIUM GLUCONATE 1000 MG: 20 INJECTION, SOLUTION INTRAVENOUS at 07:18

## 2024-06-29 RX ADMIN — SODIUM CHLORIDE, PRESERVATIVE FREE 40 MG: 5 INJECTION INTRAVENOUS at 07:54

## 2024-06-29 RX ADMIN — SODIUM CHLORIDE 25 MG: 9 INJECTION, SOLUTION INTRAVENOUS at 10:33

## 2024-06-29 RX ADMIN — POTASSIUM CHLORIDE 40 MEQ: 1500 TABLET, EXTENDED RELEASE ORAL at 10:19

## 2024-06-29 RX ADMIN — CALCIUM CARBONATE 500 MG: 500 TABLET, CHEWABLE ORAL at 12:07

## 2024-06-29 RX ADMIN — SODIUM CHLORIDE, POTASSIUM CHLORIDE, SODIUM LACTATE AND CALCIUM CHLORIDE: 600; 310; 30; 20 INJECTION, SOLUTION INTRAVENOUS at 21:28

## 2024-06-29 RX ADMIN — SODIUM CHLORIDE, PRESERVATIVE FREE 40 MG: 5 INJECTION INTRAVENOUS at 03:29

## 2024-06-29 ASSESSMENT — PAIN SCALES - GENERAL
PAINLEVEL_OUTOF10: 5
PAINLEVEL_OUTOF10: 0
PAINLEVEL_OUTOF10: 0

## 2024-06-29 ASSESSMENT — PAIN DESCRIPTION - ONSET: ONSET: GRADUAL

## 2024-06-29 ASSESSMENT — PAIN DESCRIPTION - LOCATION: LOCATION: ARM

## 2024-06-29 ASSESSMENT — PAIN DESCRIPTION - DESCRIPTORS: DESCRIPTORS: ACHING

## 2024-06-29 ASSESSMENT — PAIN - FUNCTIONAL ASSESSMENT: PAIN_FUNCTIONAL_ASSESSMENT: PREVENTS OR INTERFERES SOME ACTIVE ACTIVITIES AND ADLS

## 2024-06-29 ASSESSMENT — PAIN DESCRIPTION - PAIN TYPE: TYPE: ACUTE PAIN

## 2024-06-29 ASSESSMENT — PAIN DESCRIPTION - FREQUENCY: FREQUENCY: CONTINUOUS

## 2024-06-29 ASSESSMENT — PAIN DESCRIPTION - ORIENTATION: ORIENTATION: RIGHT

## 2024-06-29 NOTE — PROGRESS NOTES
PROGRESS NOTE  By Allan Hoffman M.D.    [facility]  Dr. Flaquita Hernandez M.D.,  Dr. Carlos Devlin M.D.,   Dr. Morris Corley D.O.,  Dr. Davis Sloan M.D.,  Dr. Andre Eisenberg DJacklynO.,          Lizette Menendez  69 y.o.  female    SUBJECTIVE:  Complains of some abdominal discomfort.  No further nausea vomiting including no hematemesis.  Daughter and son at bedside    OBJECTIVE:    BP (!) 137/90   Pulse 83   Temp 97 °F (36.1 °C) (Axillary)   Resp 22   Ht 1.626 m (5' 4\")   Wt 53.5 kg (118 lb)   SpO2 98%   BMI 20.25 kg/m²     General: NAD/adult  female.  AAOx3  HEENT: Atraumatic/normocephalic head.  Anicteric sclera/moist oral mucosa  Neck: Supple with trachea midline  Chest: Symmetric excursion/no with respirations  Cor: Regular  Abd.: After nondistended.  No guarding  Extr.:  No peripheral edema  Skin: Warm and dry/anicteric      DATA:    Monitor data reviewed -sinus rhythm noted.       Lab Results   Component Value Date/Time    WBC 8.4 06/29/2024 03:15 AM    RBC 2.30 06/29/2024 03:15 AM    HGB Unable to perform testing: Lab accident. 06/29/2024 08:00 AM    HGB 10.3 06/29/2024 08:00 AM    HCT 20.7 06/29/2024 03:15 AM    MCV 90.0 06/29/2024 03:15 AM    MCH 28.3 06/29/2024 03:15 AM    MCHC 31.4 06/29/2024 03:15 AM    RDW 19.3 06/29/2024 03:15 AM     06/28/2024 03:44 AM    MPV 11.4 06/29/2024 03:15 AM     Lab Results   Component Value Date/Time     06/29/2024 05:06 AM    K 3.2 06/29/2024 05:06 AM     06/29/2024 05:06 AM    CO2 21 06/29/2024 05:06 AM    BUN 22 06/29/2024 05:06 AM    CREATININE 0.8 06/29/2024 05:06 AM    CALCIUM 7.6 06/29/2024 05:06 AM    BILITOT 0.5 06/29/2024 05:06 AM    ALKPHOS 34 06/29/2024 05:06 AM    AST 14 06/29/2024 05:06 AM    ALT 10 06/29/2024 05:06 AM     Lab Results   Component Value Date/Time    LIPASE 14 06/27/2024 08:30 PM     No results found for: \"AMYLASE\"      ASSESSMENT/PLAN:  Patient Active Problem List   Diagnosis    Atrial fibrillation with

## 2024-06-29 NOTE — PATIENT CARE CONFERENCE
Intensive Care Daily Quality Rounding Checklist        ICU Team Members: bedside nurse, charge nurse, , residents     ICU Day #: NUMBER: 2     SOFA Score: 1     Intubation Date:  N/A     Ventilator Day #: N/A     Central Line Insertion Date:  N/A                                                    Day #:                                                     Indication:       Arterial Line Insertion Date:  N/A                             Day #:      Temporary Hemodialysis Catheter Insertion Date:  N/A                             Day #      DVT Prophylaxis: N/A due to bleeding    GI Prophylaxis: Protonix q 6     Simmons Catheter Insertion Date:  N/A                                        Day #:                              Indications:                              Continued need (if yes, reason documented and discussed with physician):      Skin Issues/ Wounds and ordered treatment discussed on rounds: No issus, SOS precautions     Goals/ Plans for the Day:  Monitor labs and vitals, replace/transfuse as needed,CT chest     Reviewed plan and goals for day with patient and/or representative:

## 2024-06-29 NOTE — PROGRESS NOTES
[facility] Hospitalist Progress Note    Admitting Date and Time: 6/27/2024  7:42 PM  Admit Dx: Metabolic acidosis [E87.20]  Atrial fibrillation with rapid ventricular response (HCC) [I48.91]  Abdominal pain, unspecified abdominal location [R10.9]  Gastrointestinal hemorrhage, unspecified gastrointestinal hemorrhage type [K92.2]  Atrial fibrillation, unspecified type (HCC) [I48.91]    Subjective:  Patient is being followed for Metabolic acidosis [E87.20]  Atrial fibrillation with rapid ventricular response (HCC) [I48.91]  Abdominal pain, unspecified abdominal location [R10.9]  Gastrointestinal hemorrhage, unspecified gastrointestinal hemorrhage type [K92.2]  Atrial fibrillation, unspecified type (HCC) [I48.91]     Patient states that she feels like something is stuck in her chest    ROS: denies fever, chills, cp, sob, n/v, HA unless stated above.      potassium chloride  40 mEq Oral Once    calcium gluconate  1,000 mg IntraVENous Once    magnesium sulfate  2,000 mg IntraVENous Once    metoprolol succinate  25 mg Oral BID    sodium chloride flush  5-40 mL IntraVENous 2 times per day    sodium chloride flush  5-40 mL IntraVENous 2 times per day    piperacillin-tazobactam  3,375 mg IntraVENous Q8H    pantoprazole (PROTONIX) 40 mg in sodium chloride (PF) 0.9 % 10 mL injection  40 mg IntraVENous Q6H    nicotine  1 patch TransDERmal Daily     sodium chloride, , PRN  sodium chloride, , PRN  sodium chloride flush, 5-40 mL, PRN  sodium chloride, , PRN  ondansetron, 4 mg, Q8H PRN   Or  ondansetron, 4 mg, Q6H PRN  acetaminophen, 650 mg, Q6H PRN   Or  acetaminophen, 650 mg, Q6H PRN  sodium chloride flush, 5-40 mL, PRN  sodium chloride, , PRN  perflutren lipid microspheres, 1.5 mL, ONCE PRN  sodium chloride, , PRN  LORazepam, 1 mg, Q6H PRN         Objective:    /77   Pulse 83   Temp 98 °F (36.7 °C)   Resp 18   Ht 1.626 m (5' 4\")   Wt 53.5 kg (118 lb)   SpO2 96%   BMI 20.25 kg/m²     General Appearance: alert

## 2024-06-29 NOTE — PROGRESS NOTES
4 Eyes Skin Assessment     NAME:  Lizette Menendez  YOB: 1955  MEDICAL RECORD NUMBER:  [id number]    The patient is being assessed for  Transfer to New Unit    I agree that at least one RN has performed a thorough Head to Toe Skin Assessment on the patient. ALL assessment sites listed below have been assessed.      Areas assessed by both nurses:    Head, Face, Ears, Shoulders, Back, Chest, Arms, Elbows, Hands, Sacrum. Buttock, Coccyx, Ischium, Legs. Feet and Heels, and Under Medical Devices         Does the Patient have a Wound? Yes wound(s) were present on assessment. LDA wound assessment was Initiated and completed by RN       Narayan Prevention initiated by RN: Yes  Wound Care Orders initiated by RN: Yes    Pressure Injury (Stage 3,4, Unstageable, DTI, NWPT, and Complex wounds) if present, place Wound referral order by RN under : No    New Ostomies, if present place, Ostomy referral order under : No     Nurse 1 eSignature: Electronically signed by Pia Wolfe RN on 6/29/24 at 6:49 PM EDT    **SHARE this note so that the co-signing nurse can place an eSignature**    Nurse 2 eSignature: Electronically signed by Clover Anna RN on 6/29/24 at 7:00 PM EDT

## 2024-06-29 NOTE — PROGRESS NOTES
[facility] CARDIOLOGY PROGRESS NOTE  [facility]        Subjective: Admitted with abdominal symptoms, nausea vomiting blood in stools and underwent cauterization of bleeding ulcers by EGD June 26, 2024 presently in atrial fibrillation    Now in normal sinus rhythm.    Family at bedside she is conversant and pleasant.      Objective: Medications lab chart telemetry all reviewed.    Patient Vitals for the past 24 hrs:   BP Temp Temp src Pulse Resp SpO2   06/29/24 1600 (!) 141/78 97 °F (36.1 °C) Axillary 80 16 98 %   06/29/24 1500 131/65 -- -- 85 17 98 %   06/29/24 1300 (!) 149/77 -- -- 84 19 97 %   06/29/24 1200 (!) 143/70 97.1 °F (36.2 °C) Axillary 85 29 97 %   06/29/24 1100 (!) 148/84 -- -- 80 18 --   06/29/24 1019 (!) 137/90 -- -- 83 -- --   06/29/24 1000 (!) 137/90 -- -- 87 18 96 %   06/29/24 0900 (!) 152/74 -- -- 85 22 98 %   06/29/24 0800 (!) 156/84 97 °F (36.1 °C) Axillary 84 14 98 %   06/29/24 0700 135/77 -- -- 83 18 96 %   06/29/24 0600 -- -- -- 79 21 93 %   06/29/24 0500 125/83 -- -- 89 26 96 %   06/29/24 0441 -- -- -- 94 19 96 %   06/29/24 0420 (!) 156/84 98 °F (36.7 °C) -- 94 16 98 %   06/29/24 0400 (!) 156/84 -- -- 93 17 98 %   06/29/24 0300 (!) 154/83 -- -- 86 25 100 %   06/29/24 0200 (!) 170/87 -- -- 88 15 --   06/29/24 0100 (!) 145/83 -- -- 90 21 98 %   06/29/24 0016 -- -- -- 89 -- 97 %   06/29/24 0000 (!) 163/83 98.2 °F (36.8 °C) Oral 78 19 97 %   06/28/24 2300 (!) 144/69 -- -- 89 (!) 36 --   06/28/24 2200 (!) 145/72 -- -- 88 18 --   06/28/24 2100 (!) 132/59 -- -- 85 15 97 %   06/28/24 2000 (!) 156/66 -- -- 86 23 98 %   06/28/24 1945 (!) 145/77 99.4 °F (37.4 °C) Axillary 89 18 99 %   06/28/24 1900 126/72 -- -- 84 19 --         Intake/Output Summary (Last 24 hours) at 6/29/2024 1802  Last data filed at 6/29/2024 1505  Gross per 24 hour   Intake 698.92 ml   Output 2600 ml   Net -1901.08 ml       Wt Readings from Last 3 Encounters:   06/27/24 53.5 kg (118 lb)       Telemetry: Personally interpreted

## 2024-06-29 NOTE — PLAN OF CARE
Problem: Discharge Planning  Goal: Discharge to home or other facility with appropriate resources  Outcome: Progressing     Problem: Skin/Tissue Integrity  Goal: Absence of new skin breakdown  Description: 1.  Monitor for areas of redness and/or skin breakdown  2.  Assess vascular access sites hourly  3.  Every 4-6 hours minimum:  Change oxygen saturation probe site  4.  Every 4-6 hours:  If on nasal continuous positive airway pressure, respiratory therapy assess nares and determine need for appliance change or resting period.  Outcome: Progressing     Problem: Safety - Adult  Goal: Free from fall injury  Outcome: Progressing     Problem: Pain  Goal: Verbalizes/displays adequate comfort level or baseline comfort level  Outcome: Progressing     Problem: Nutrition Deficit:  Goal: Optimize nutritional status  6/29/2024 0014 by Luther Calvillo RN  Outcome: Progressing  6/28/2024 1515 by Cyndi Leonardo RD, CNSC, LD  Flowsheets (Taken 6/28/2024 1515)  Nutrient intake appropriate for improving, restoring, or maintaining nutritional needs:   Assess nutritional status and recommend course of action   Monitor oral intake, labs, and treatment plans   Recommend appropriate diets, oral nutritional supplements, and vitamin/mineral supplements

## 2024-06-29 NOTE — PROGRESS NOTES
went to see patient in an attempt to follow up on previous request for a Spiritual Consult. Patient was unavailable. Patient was absent from the room.   prayed a silent prayer for the patient.  Chaplains will remain available to offer spiritual and emotional support as needed.

## 2024-06-29 NOTE — PROGRESS NOTES
went to see patient in response to a spiritual consult request. Patient was sleeping.   prayed a silent prayer for the patient and left devotional material and information about  services.  Chaplains will attempt a follow up visit before end of shift.

## 2024-06-29 NOTE — PROGRESS NOTES
Critical Care Team - Daily Progress Note      Date and time: 6/29/2024 10:52 AM  Patient's name:  Lizette Menendez  Medical Record Number: [id number]  Patient's account/billing number: [id number]  Patient's YOB: 1955  Age: 69 y.o.  Date of Admission: 6/27/2024  7:42 PM  Length of stay during current admission: 1      Primary Care Physician: Dann Prescott MD  ICU Attending Physician: Dr. Gamez    Code Status: Full Code    Reason for ICU admission: GI bleed/A-fib RVR      SUBJECTIVE:     OVERNIGHT EVENTS:       Patient remains critically ill requiring ICU level of care.  Patient remains critically ill requiring ICU level of care.  Had a reading of and CBC last night that was very low at 6.5 she was given 1 unit of PRBCs however when comparing with recent labs, do not believe this was accurate.  Patient's hemoglobin after this unit is 10.3.  She has no new problems or complaints.  Did have some slight nausea and was given some Compazine for that.  Potassium is low at 3.2 which was replaced.      Intake/Output:   I/O this shift:  In: -   Out: 1100 [Urine:1100]  I/O last 3 completed shifts:  In: 1620.4 [I.V.:323; Blood:1044.8; IV Piggyback:252.7]  Out: 2100 [Urine:1700; Emesis/NG output:400]    Awake and following commands: Yes  Current Ventilation: - No ventilator support  Secretions: Minimal  Sedation: none  Paralyzed: No  Vasopressors: None    ROS:  Unless stated above, ROS is otherwise negative.      Initial HPI + past overnight events:  Ms. Lizette Menendez is a 69 year old who was admitted to [facility] ICU on June 28, 2024 after presenting to the ED with reports of abdominal pain, nausea vomiting, blood in stool. The patient reports to having an EGD with Dr. Corley on Wednesday (6/26). The patient reports to having bloody stools while at home and generalized weakness. While being evaluated in the ED, it was noted that she had an acute drop in hemoglobin 10.1>>7.2 and appears pale.  Other labs pertinent  patient management decisions.   This test has been authorized by the FDA under an Emergency Use Authorization (EUA) for use   by authorized laboratories.        Fact sheet for Healthcare Providers: https://www.fda.gov/media/781868/download  Fact sheet for Patients: https://www.fda.gov/media/987659/download          Methodology: Isothermal Nucleic Acid Amplification         Influenza A+B, PCR [[id number]] Collected: 06/27/24 2030    Order Status: Completed Specimen: Nasopharyngeal Updated: 06/27/24 2110     Influenza A by PCR Not Detected     Comment: Methodology: Isothermal Nucleic Acid Amplification        Influenza B by PCR Not Detected     Comment: Methodology: Isothermal Nucleic Acid Amplification                Radiology/Imaging:     Chest Xray (6/29/2024):  Xray Result (most recent):  XR CHEST PORTABLE 06/27/2024    Narrative  EXAMINATION:  ONE XRAY VIEW OF THE CHEST    6/27/2024 9:26 pm    COMPARISON:  None.    HISTORY:  ORDERING SYSTEM PROVIDED HISTORY: Sepsis  TECHNOLOGIST PROVIDED HISTORY:  Reason for exam:->Sepsis    FINDINGS:  The lungs are without acute focal process.  There is no effusion or  pneumothorax. The cardiomediastinal silhouette is without acute process. The  osseous structures are without acute process.  3.2 cm x 1.7 cm lobulated  well-circumscribed nodule right mid lung.    Impression  1. No acute process.  2. 3.2 cm x 1.7 cm lobulated well-circumscribed nodule right mid lung. CT  chest recommended for further evaluation.       ASSESSMENT:     Patient Active Problem List    Diagnosis Date Noted    Atrial fibrillation with rapid ventricular response (HCC) 06/28/2024    Hypotension 06/28/2024    Acute blood loss anemia 06/28/2024    Lactic acidosis 06/28/2024    Gastrointestinal hemorrhage 06/28/2024         PLAN:     Neuro  No acute problems  Patient on Zoloft at home, this was resumed    Respiratory  Lung nodule  Found on imaging   CT chest without contrast to be done today  Outpatient  follow up    Cardiovascular  Atrial fibrillation with RVR  Transition patient to metoprolol XL instead of amiodarone drip  Cardiology consult appreciated  Echocardiogram pending    Hypotension  Likely secondary to GI bleed  Cardizem also probably contributory  Stop Cardizem    GI  GI bleed  Patient is status post EGD with cauterization of ulcers on 6/26  GI consulted and they did a bedside EGD yesterday and placed 3 clips  Continue to monitor H&H and transfuse as necessary keep hemoglobin over 7  PPI twice daily  Carafate added    Renal  YANELIS  No creatinine documented prior, however likely secondary to hypovolemia  Monitor BMPs for electrolytes  Fluid hydration with LR    Hypokalemia  Potassium 3.2 this morning  Replaced  Monitor BMPs and replace as necessary    Hypocalcemia  Ionized calcium 1.01  Patient given 1 g of calcium gluconate  Monitor BMPs and replace as necessary    Infectious disease  Patient currently on Zosyn for concern of potential intra-abdominal infection  Cultures negative so far    Heme/Onc  Acute blood loss anemia  Likely secondary to GI bleed  Transfuse as necessary to keep hemoglobin above 7  Monitor CBCs  Transfused iron    Endocrine  No acute problems    Social/Spiritual/DNR/Other  Code status: Full Code  Diet ADULT DIET; Full Liquid  ADULT ORAL NUTRITION SUPPLEMENT; Breakfast, Dinner, Lunch; Standard High Calorie/High Protein Oral Supplement  Stress ulcer prophylaxis: Protonix  DVT prophylaxis: intermittent pneumatic compression boots  Consultations needed: Yes  Transfer out of ICU today? No    Lines/catheters  Peripheral IVs    Patient remains critically ill and requiring ICU level care.    Russ Nicole DO  10:52 AM  06/29/24       I personally saw, examined and provided care for the patient. I performed the substantive portion of the visit. Radiographs, labs and medication list were reviewed by me independently. Review of Residents documentation was conducted and revisions were made as

## 2024-06-30 LAB
ALBUMIN SERPL-MCNC: 2.9 G/DL (ref 3.5–5.2)
ALP SERPL-CCNC: 39 U/L (ref 35–104)
ALT SERPL-CCNC: 9 U/L (ref 0–32)
ANION GAP SERPL CALCULATED.3IONS-SCNC: 9 MMOL/L (ref 7–16)
AST SERPL-CCNC: 14 U/L (ref 0–31)
BILIRUB SERPL-MCNC: 0.6 MG/DL (ref 0–1.2)
BUN SERPL-MCNC: 9 MG/DL (ref 6–23)
CA-I BLD-SCNC: 1.16 MMOL/L (ref 1.15–1.33)
CALCIUM SERPL-MCNC: 8.1 MG/DL (ref 8.6–10.2)
CHLORIDE SERPL-SCNC: 107 MMOL/L (ref 98–107)
CO2 SERPL-SCNC: 24 MMOL/L (ref 22–29)
CREAT SERPL-MCNC: 0.8 MG/DL (ref 0.5–1)
ERYTHROCYTE [DISTWIDTH] IN BLOOD BY AUTOMATED COUNT: 16.8 % (ref 11.5–15)
GFR, ESTIMATED: 82 ML/MIN/1.73M2
GLUCOSE SERPL-MCNC: 90 MG/DL (ref 74–99)
HCT VFR BLD AUTO: 29.5 % (ref 34–48)
HGB BLD-MCNC: 9.2 G/DL (ref 11.5–15.5)
HGB BLD-MCNC: 9.4 G/DL (ref 11.5–15.5)
MAGNESIUM SERPL-MCNC: 1.6 MG/DL (ref 1.6–2.6)
MCH RBC QN AUTO: 27.8 PG (ref 26–35)
MCHC RBC AUTO-ENTMCNC: 31.9 G/DL (ref 32–34.5)
MCV RBC AUTO: 87.3 FL (ref 80–99.9)
PHOSPHATE SERPL-MCNC: 2.6 MG/DL (ref 2.5–4.5)
PLATELET, FLUORESCENCE: 121 K/UL (ref 130–450)
PMV BLD AUTO: 11.2 FL (ref 7–12)
POTASSIUM SERPL-SCNC: 3.7 MMOL/L (ref 3.5–5)
PROT SERPL-MCNC: 4.5 G/DL (ref 6.4–8.3)
RBC # BLD AUTO: 3.38 M/UL (ref 3.5–5.5)
SODIUM SERPL-SCNC: 140 MMOL/L (ref 132–146)
WBC OTHER # BLD: 10.7 K/UL (ref 4.5–11.5)

## 2024-06-30 PROCEDURE — 2580000003 HC RX 258: Performed by: HOSPITALIST

## 2024-06-30 PROCEDURE — 6360000002 HC RX W HCPCS: Performed by: HOSPITALIST

## 2024-06-30 PROCEDURE — 6370000000 HC RX 637 (ALT 250 FOR IP)

## 2024-06-30 PROCEDURE — 84100 ASSAY OF PHOSPHORUS: CPT

## 2024-06-30 PROCEDURE — 85027 COMPLETE CBC AUTOMATED: CPT

## 2024-06-30 PROCEDURE — 2060000000 HC ICU INTERMEDIATE R&B

## 2024-06-30 PROCEDURE — 6370000000 HC RX 637 (ALT 250 FOR IP): Performed by: INTERNAL MEDICINE

## 2024-06-30 PROCEDURE — C9113 INJ PANTOPRAZOLE SODIUM, VIA: HCPCS | Performed by: HOSPITALIST

## 2024-06-30 PROCEDURE — 82330 ASSAY OF CALCIUM: CPT

## 2024-06-30 PROCEDURE — 2580000003 HC RX 258

## 2024-06-30 PROCEDURE — 6360000002 HC RX W HCPCS

## 2024-06-30 PROCEDURE — 83735 ASSAY OF MAGNESIUM: CPT

## 2024-06-30 PROCEDURE — 85018 HEMOGLOBIN: CPT

## 2024-06-30 PROCEDURE — 80053 COMPREHEN METABOLIC PANEL: CPT

## 2024-06-30 PROCEDURE — 99233 SBSQ HOSP IP/OBS HIGH 50: CPT | Performed by: INTERNAL MEDICINE

## 2024-06-30 RX ORDER — PANTOPRAZOLE SODIUM 40 MG/1
40 TABLET, DELAYED RELEASE ORAL
Qty: 60 TABLET | Refills: 2 | Status: SHIPPED | OUTPATIENT
Start: 2024-06-30

## 2024-06-30 RX ORDER — METOPROLOL SUCCINATE 25 MG/1
25 TABLET, EXTENDED RELEASE ORAL 2 TIMES DAILY
Qty: 30 TABLET | Refills: 3 | Status: SHIPPED | OUTPATIENT
Start: 2024-06-30 | End: 2024-07-03

## 2024-06-30 RX ORDER — SUCRALFATE 1 G/1
1 TABLET ORAL
Qty: 7 TABLET | Refills: 0 | Status: SHIPPED | OUTPATIENT
Start: 2024-06-30 | End: 2024-07-03

## 2024-06-30 RX ORDER — NICOTINE 21 MG/24HR
1 PATCH, TRANSDERMAL 24 HOURS TRANSDERMAL DAILY
Qty: 30 PATCH | Refills: 3 | Status: SHIPPED | OUTPATIENT
Start: 2024-06-30 | End: 2024-07-03

## 2024-06-30 RX ADMIN — SODIUM CHLORIDE, PRESERVATIVE FREE 10 ML: 5 INJECTION INTRAVENOUS at 20:46

## 2024-06-30 RX ADMIN — SODIUM CHLORIDE, PRESERVATIVE FREE 40 MG: 5 INJECTION INTRAVENOUS at 20:45

## 2024-06-30 RX ADMIN — SUCRALFATE 1 G: 1 TABLET ORAL at 20:45

## 2024-06-30 RX ADMIN — SODIUM CHLORIDE, PRESERVATIVE FREE 40 MG: 5 INJECTION INTRAVENOUS at 00:10

## 2024-06-30 RX ADMIN — SUCRALFATE 1 G: 1 TABLET ORAL at 11:27

## 2024-06-30 RX ADMIN — ACETAMINOPHEN 650 MG: 325 TABLET ORAL at 16:13

## 2024-06-30 RX ADMIN — PIPERACILLIN AND TAZOBACTAM 3375 MG: 3; .375 INJECTION, POWDER, LYOPHILIZED, FOR SOLUTION INTRAVENOUS at 08:25

## 2024-06-30 RX ADMIN — SODIUM CHLORIDE 50 ML: 9 INJECTION, SOLUTION INTRAVENOUS at 00:13

## 2024-06-30 RX ADMIN — LORAZEPAM 1 MG: 1 TABLET ORAL at 20:45

## 2024-06-30 RX ADMIN — SERTRALINE 100 MG: 100 TABLET, FILM COATED ORAL at 09:28

## 2024-06-30 RX ADMIN — SODIUM CHLORIDE, PRESERVATIVE FREE 40 MG: 5 INJECTION INTRAVENOUS at 13:34

## 2024-06-30 RX ADMIN — SODIUM CHLORIDE, PRESERVATIVE FREE 40 MG: 5 INJECTION INTRAVENOUS at 08:19

## 2024-06-30 RX ADMIN — METOPROLOL SUCCINATE 25 MG: 25 TABLET, FILM COATED, EXTENDED RELEASE ORAL at 20:45

## 2024-06-30 RX ADMIN — SUCRALFATE 1 G: 1 TABLET ORAL at 16:54

## 2024-06-30 RX ADMIN — METOPROLOL SUCCINATE 25 MG: 25 TABLET, FILM COATED, EXTENDED RELEASE ORAL at 09:28

## 2024-06-30 RX ADMIN — PIPERACILLIN AND TAZOBACTAM 3375 MG: 3; .375 INJECTION, POWDER, LYOPHILIZED, FOR SOLUTION INTRAVENOUS at 00:14

## 2024-06-30 RX ADMIN — SUCRALFATE 1 G: 1 TABLET ORAL at 05:03

## 2024-06-30 ASSESSMENT — PAIN SCALES - GENERAL
PAINLEVEL_OUTOF10: 0
PAINLEVEL_OUTOF10: 7
PAINLEVEL_OUTOF10: 0
PAINLEVEL_OUTOF10: 8

## 2024-06-30 ASSESSMENT — PAIN DESCRIPTION - LOCATION: LOCATION: KNEE

## 2024-06-30 ASSESSMENT — PAIN DESCRIPTION - ORIENTATION: ORIENTATION: RIGHT

## 2024-06-30 ASSESSMENT — PAIN DESCRIPTION - DESCRIPTORS: DESCRIPTORS: ACHING

## 2024-06-30 ASSESSMENT — PAIN - FUNCTIONAL ASSESSMENT: PAIN_FUNCTIONAL_ASSESSMENT: ACTIVITIES ARE NOT PREVENTED

## 2024-06-30 NOTE — PROGRESS NOTES
[facility]        Subjective: Seeing patient was admitted with GI bleeding and transient atrial fibrillation reported to normal sinus rhythm within the first 24 hours and currently in normal sinus rhythm.    Feeling better without reported shortness of breath palpitations or chest pain.    This morning in normal sinus rhythm on telemetry.      Objective: Medications lab chart telemetry all reviewed.    Patient Vitals for the past 24 hrs:   BP Temp Temp src Pulse Resp SpO2 Weight   06/30/24 0928 129/65 -- -- 80 -- -- --   06/30/24 0800 (!) 109/55 98.7 °F (37.1 °C) Oral 80 16 92 % --   06/30/24 0435 123/83 98.4 °F (36.9 °C) Oral 76 16 -- 60.2 kg (132 lb 11.5 oz)   06/29/24 2330 117/73 98.6 °F (37 °C) Oral 86 18 91 % --   06/29/24 1915 139/65 98.3 °F (36.8 °C) Oral 78 18 94 % --   06/29/24 1845 137/70 98.5 °F (36.9 °C) Oral 94 16 93 % --   06/29/24 1600 (!) 141/78 97 °F (36.1 °C) Axillary 80 16 98 % --   06/29/24 1500 131/65 -- -- 85 17 98 % --   06/29/24 1300 (!) 149/77 -- -- 84 19 97 % --   06/29/24 1200 (!) 143/70 97.1 °F (36.2 °C) Axillary 85 29 97 % --   06/29/24 1100 (!) 148/84 -- -- 80 18 -- --   06/29/24 1019 (!) 137/90 -- -- 83 -- -- --   06/29/24 1000 (!) 137/90 -- -- 87 18 96 % --         Intake/Output Summary (Last 24 hours) at 6/30/2024 0953  Last data filed at 6/29/2024 2215  Gross per 24 hour   Intake 360 ml   Output 2200 ml   Net -1840 ml       Wt Readings from Last 3 Encounters:   06/30/24 60.2 kg (132 lb 11.5 oz)       Telemetry: Personally interpreted shows normal sinus rhythm heart rate in the 70s and 80s    Current meds: Scheduled Meds:   metoprolol succinate  25 mg Oral BID    sucralfate  1 g Oral 4x Daily AC & HS    sertraline  100 mg Oral Daily    sodium chloride flush  5-40 mL IntraVENous 2 times per day    sodium chloride flush  5-40 mL IntraVENous 2 times per day    piperacillin-tazobactam  3,375 mg IntraVENous Q8H    pantoprazole (PROTONIX) 40 mg in

## 2024-06-30 NOTE — PROGRESS NOTES
[facility] Hospitalist Progress Note    Admitting Date and Time: 6/27/2024  7:42 PM  Admit Dx: Metabolic acidosis [E87.20]  Atrial fibrillation with rapid ventricular response (HCC) [I48.91]  Abdominal pain, unspecified abdominal location [R10.9]  Gastrointestinal hemorrhage, unspecified gastrointestinal hemorrhage type [K92.2]  Atrial fibrillation, unspecified type (HCC) [I48.91]    Subjective:  Patient is being followed for Metabolic acidosis [E87.20]  Atrial fibrillation with rapid ventricular response (HCC) [I48.91]  Abdominal pain, unspecified abdominal location [R10.9]  Gastrointestinal hemorrhage, unspecified gastrointestinal hemorrhage type [K92.2]  Atrial fibrillation, unspecified type (HCC) [I48.91]     No acute events overnight    ROS: denies fever, chills, cp, sob, n/v, HA unless stated above.      metoprolol succinate  25 mg Oral BID    sucralfate  1 g Oral 4x Daily AC & HS    sertraline  100 mg Oral Daily    sodium chloride flush  5-40 mL IntraVENous 2 times per day    sodium chloride flush  5-40 mL IntraVENous 2 times per day    piperacillin-tazobactam  3,375 mg IntraVENous Q8H    pantoprazole (PROTONIX) 40 mg in sodium chloride (PF) 0.9 % 10 mL injection  40 mg IntraVENous Q6H    nicotine  1 patch TransDERmal Daily     LORazepam, 1 mg, Q12H PRN  sodium chloride, , PRN  sodium chloride flush, 5-40 mL, PRN  sodium chloride, , PRN  ondansetron, 4 mg, Q8H PRN   Or  ondansetron, 4 mg, Q6H PRN  acetaminophen, 650 mg, Q6H PRN   Or  acetaminophen, 650 mg, Q6H PRN  sodium chloride flush, 5-40 mL, PRN  sodium chloride, , PRN  perflutren lipid microspheres, 1.5 mL, ONCE PRN         Objective:    /65   Pulse 80   Temp 98.7 °F (37.1 °C) (Oral)   Resp 16   Ht 1.626 m (5' 4\")   Wt 60.2 kg (132 lb 11.5 oz)   SpO2 92%   BMI 22.78 kg/m²     General Appearance: alert and oriented to person, place and time and in no acute distress  Skin: warm and dry  Head: normocephalic and atraumatic  Eyes:  onset atrial fibrillation [id number] 2 - Continue toprol. No anticoagulation needed as per cardiology  Hypotension - Continue LR @ 125  Hematemesis - EGD done. Indicated that there is reflux esophagitis with no bleeding source, 2 small erosion vs clean based antral ulcer, and ulcer in cardia with vissible vessel with active oozing blood. It was treated with epinephrine. As per GI, continue PPI drip for 72 hours and then ID for 8 weeks. Continue carafate. Hold off on vanquish. Okay for full liquid diet. OP colonoscopy  Acute blood loss anemia S/P 3 units PRBC - Keep hemoglobin greater than 7. Ferrlecit has been ordered  NSTEMI II 2/2 anemia and hypotension  DVT prophylaxis - SCD    Consultant recommendations reviewed.    NOTE: This report was transcribed using voice recognition software. Every effort was made to ensure accuracy; however, inadvertent computerized transcription errors may be present.    Electronically signed by Trupti Nathan DO on 6/30/2024 at 11:46 AM

## 2024-06-30 NOTE — PLAN OF CARE
Problem: Discharge Planning  Goal: Discharge to home or other facility with appropriate resources  6/29/2024 2230 by Giovanna Rodney RN  Outcome: Progressing  Flowsheets (Taken 6/29/2024 1945)  Discharge to home or other facility with appropriate resources: Identify barriers to discharge with patient and caregiver  6/29/2024 1728 by Jennifer Avila RN  Outcome: Progressing     Problem: Skin/Tissue Integrity  Goal: Absence of new skin breakdown  Description: 1.  Monitor for areas of redness and/or skin breakdown  2.  Assess vascular access sites hourly  3.  Every 4-6 hours minimum:  Change oxygen saturation probe site  4.  Every 4-6 hours:  If on nasal continuous positive airway pressure, respiratory therapy assess nares and determine need for appliance change or resting period.  6/29/2024 2230 by Giovanna Rodney RN  Outcome: Progressing  6/29/2024 1728 by Jennifer Avila RN  Outcome: Progressing     Problem: Safety - Adult  Goal: Free from fall injury  6/29/2024 2230 by Giovanna Rodney RN  Outcome: Progressing  6/29/2024 1728 by Jennifer Avila RN  Outcome: Progressing     Problem: Pain  Goal: Verbalizes/displays adequate comfort level or baseline comfort level  6/29/2024 2230 by Giovanna Rodney RN  Outcome: Progressing  6/29/2024 1728 by Jennifer Avila RN  Outcome: Progressing     Problem: Nutrition Deficit:  Goal: Optimize nutritional status  6/29/2024 2230 by Giovanna Rodney RN  Outcome: Progressing  6/29/2024 1728 by Jennifer Avila RN  Outcome: Progressing

## 2024-06-30 NOTE — PROGRESS NOTES
PROGRESS NOTE  By Allan Hoffman M.D.    [facility]  Dr. Flaquita Hernandez M.D.,  Dr. Carlos Devlin M.D.,   Dr. Morris Corley D.O.,  Dr. Davis Sloan M.D.,  JUDY DominguezO.,          Lizette Menendez  69 y.o.  female    SUBJECTIVE:  No new complaints.  No family at bedside    OBJECTIVE:    /60   Pulse 61   Temp 98 °F (36.7 °C) (Oral)   Resp 16   Ht 1.626 m (5' 4\")   Wt 60.2 kg (132 lb 11.5 oz)   SpO2 92%   BMI 22.78 kg/m²     General: NAD/adult  female.  AAOx3  HEENT: Anicteric sclera/moist oral mucosa  Neck: Supple with trachea midline  Chest: Symmetric excursion/nonlabored respirations  Cor: Regular  Abd.: Soft/nontender and nondistended  Extr.:RUE with edema.  No BLE edema  Skin: Warm and dry/anicteric      DATA:    Monitor data reviewed -sinus rhythm noted.       Lab Results   Component Value Date/Time    WBC 10.7 06/30/2024 03:25 AM    RBC 3.38 06/30/2024 03:25 AM    HGB 9.2 06/30/2024 10:33 AM    HCT 29.5 06/30/2024 03:25 AM    MCV 87.3 06/30/2024 03:25 AM    MCH 27.8 06/30/2024 03:25 AM    MCHC 31.9 06/30/2024 03:25 AM    RDW 16.8 06/30/2024 03:25 AM     06/28/2024 03:44 AM    MPV 11.2 06/30/2024 03:25 AM     Lab Results   Component Value Date/Time     06/30/2024 03:25 AM    K 3.7 06/30/2024 03:25 AM     06/30/2024 03:25 AM    CO2 24 06/30/2024 03:25 AM    BUN 9 06/30/2024 03:25 AM    CREATININE 0.8 06/30/2024 03:25 AM    CALCIUM 8.1 06/30/2024 03:25 AM    BILITOT 0.6 06/30/2024 03:25 AM    ALKPHOS 39 06/30/2024 03:25 AM    AST 14 06/30/2024 03:25 AM    ALT 9 06/30/2024 03:25 AM     Lab Results   Component Value Date/Time    LIPASE 14 06/27/2024 08:30 PM     No results found for: \"AMYLASE\"      ASSESSMENT/PLAN:  Patient Active Problem List   Diagnosis    Atrial fibrillation with rapid ventricular response (HCC)    Hypotension    Acute blood loss anemia    Lactic acidosis    Gastrointestinal hemorrhage     1.  Anemia/GI bleed  -Recent EGD with findings

## 2024-06-30 NOTE — PROGRESS NOTES
[facility] Quality Flow/Interdisciplinary Rounds Progress Note        Quality Flow Rounds held on June 30, 2024    Disciplines Attending:  Bedside Nurse, , , and Nursing Unit Leadership    Lizette Menendez was admitted on 6/27/2024  7:42 PM    Anticipated Discharge Date:  Expected Discharge Date: 07/01/24    Disposition:    Narayan Score:  Narayan Scale Score: 17    Readmission Risk              Risk of Unplanned Readmission:  12           Discussed patient goal for the day, patient clinical progression, and barriers to discharge.  The following Goal(s) of the Day/Commitment(s) have been identified:   Oncology Consult       Corinna Palm RN  June 30, 2024

## 2024-06-30 NOTE — PROGRESS NOTES
Notified Marium Strange NP of RUE redness and swelling and unsure if amio that was stopped at 1025 am this am is the reason. Spoke to pharmacist who states its too late for amio antidote since it's been greater then 1 hour if amio was the cause.

## 2024-06-30 NOTE — PROGRESS NOTES
Juan Dejesus M.D.,[facility]  José Miguel Davis D.O., GRIS., [facility]  Micky Quinn M.D.  Debby Conley M.D.   JUDY YostO.      Daily Pulmonary Progress Note    Patient:  Lizette Menendez 69 y.o. female MRN: [id number]     Date of Service: 6/30/2024      Synopsis     We are following patient for lung nodule     \"CC\"  abdominal pain     Code status:  Full     Subjective      Patient was seen and examined.  Sitting up on side of bed on room air.   Denies dyspnea, cough, or wz  Feeling better     Review of Systems:  Constitutional: Denies fever, weight loss, night sweats, and fatigue  Skin: Denies pigmentation, dark lesions, and rashes   HEENT: Denies hearing loss, tinnitus, ear drainage, epistaxis, sore throat, and hoarseness.  Cardiovascular: Denies palpitations, chest pain, and chest pressure.  Respiratory: Denies cough, dyspnea at rest, hemoptysis, apnea, and choking.  Gastrointestinal: Denies nausea, vomiting, poor appetite, diarrhea, heartburn or reflux  Genitourinary: Denies dysuria, frequency, urgency or hematuria  Musculoskeletal: Denies myalgias, muscle weakness, and bone pain  Neurological: Denies dizziness, vertigo, headache, and focal weakness  Psychological: Denies anxiety and depression  Endocrine: Denies heat intolerance and cold intolerance  Hematopoietic/Lymphatic: Denies bleeding problems and blood transfusions    24-hour events:  Tx out of Icu     Objective   OBJECTIVE:   /60   Pulse 61   Temp 98 °F (36.7 °C) (Oral)   Resp 16   Ht 1.626 m (5' 4\")   Wt 60.2 kg (132 lb 11.5 oz)   SpO2 92%   BMI 22.78 kg/m²   SpO2 Readings from Last 1 Encounters:   06/30/24 92%        I/O:    Intake/Output Summary (Last 24 hours) at 6/30/2024 1358  Last data filed at 6/29/2024 2215  Gross per 24 hour   Intake 240 ml   Output 1100 ml   Net -860 ml                      CURRENT MEDS :  Scheduled Meds:   metoprolol succinate  25 mg Oral BID    sucralfate  1 g Oral 4x Daily AC & HS     sertraline  100 mg Oral Daily    sodium chloride flush  5-40 mL IntraVENous 2 times per day    sodium chloride flush  5-40 mL IntraVENous 2 times per day    pantoprazole (PROTONIX) 40 mg in sodium chloride (PF) 0.9 % 10 mL injection  40 mg IntraVENous Q6H    nicotine  1 patch TransDERmal Daily       Physical Exam:  General Appearance: appears comfortable in no acute distress.   HEENT: Normocephalic atraumatic without obvious abnormality   Neck: Lips, mucosa, and tongue normal.  Supple, symmetrical, trachea midline, no adenopathy;thyroid:  no enlargement/tenderness/nodules or JVD.  Lung: Breath sounds CTA. Respirations   unlabored. Symmetrical expansion.  Heart: RRR, normal S1, S2. No MRG  Abdomen: Soft, NT, ND. BS present x 4 quadrants. No bruit or organomegaly.   Extremities: Pedal pulses 2+ symmetric b/l.  Extremities normal, no cyanosis, clubbing, or edema.   Musculokeletal: No joint swelling, no muscle tenderness. ROM normal in all joints of extremities.   Neurologic: Mental status: Alert and Oriented X3 .    Pertinent/ New Labs and Imaging Studies     Imaging personally reviewed:  Chest Xray (6/29/2024):  Xray Result (most recent):  XR CHEST PORTABLE 06/27/2024     Narrative  EXAMINATION:  ONE XRAY VIEW OF THE CHEST     6/27/2024 9:26 pm     COMPARISON:  None.     HISTORY:  ORDERING SYSTEM PROVIDED HISTORY: Sepsis  TECHNOLOGIST PROVIDED HISTORY:  Reason for exam:->Sepsis     FINDINGS:  The lungs are without acute focal process.  There is no effusion or  pneumothorax. The cardiomediastinal silhouette is without acute process. The  osseous structures are without acute process.  3.2 cm x 1.7 cm lobulated  well-circumscribed nodule right mid lung.     Impression  1. No acute process.  2. 3.2 cm x 1.7 cm lobulated well-circumscribed nodule right mid lung. CT  chest recommended for further evaluation.    Echo:  na    Labs:  Lab Results   Component Value Date/Time    WBC 10.7 06/30/2024 03:25 AM    RBC 3.38 06/30/2024

## 2024-07-01 ENCOUNTER — HOSPITAL ENCOUNTER (INPATIENT)
Age: 69
Discharge: HOME | DRG: 326 | End: 2024-07-03
Payer: MEDICARE

## 2024-07-01 VITALS
HEIGHT: 64 IN | BODY MASS INDEX: 22.53 KG/M2 | WEIGHT: 132 LBS | SYSTOLIC BLOOD PRESSURE: 110 MMHG | DIASTOLIC BLOOD PRESSURE: 59 MMHG

## 2024-07-01 PROBLEM — K25.4 GASTRIC ULCER WITH HEMORRHAGE: Status: ACTIVE | Noted: 2024-07-01

## 2024-07-01 PROBLEM — R91.8 LUNG MASS: Status: ACTIVE | Noted: 2024-07-01

## 2024-07-01 PROBLEM — I48.91 ATRIAL FIBRILLATION (HCC): Status: ACTIVE | Noted: 2024-07-01

## 2024-07-01 LAB
ALBUMIN SERPL-MCNC: 2.8 G/DL (ref 3.5–5.2)
ALP SERPL-CCNC: 41 U/L (ref 35–104)
ALT SERPL-CCNC: 8 U/L (ref 0–32)
ANION GAP SERPL CALCULATED.3IONS-SCNC: 9 MMOL/L (ref 7–16)
AST SERPL-CCNC: 14 U/L (ref 0–31)
BILIRUB SERPL-MCNC: 0.3 MG/DL (ref 0–1.2)
BUN SERPL-MCNC: 6 MG/DL (ref 6–23)
CALCIUM SERPL-MCNC: 8.1 MG/DL (ref 8.6–10.2)
CHLORIDE SERPL-SCNC: 103 MMOL/L (ref 98–107)
CO2 SERPL-SCNC: 26 MMOL/L (ref 22–29)
CREAT SERPL-MCNC: 0.9 MG/DL (ref 0.5–1)
ECHO AO ASC DIAM: 3 CM
ECHO AO ASCENDING AORTA INDEX: 1.83 CM/M2
ECHO AV CUSP MM: 1.2 CM
ECHO AV MEAN GRADIENT: 5 MMHG
ECHO AV MEAN VELOCITY: 1 M/S
ECHO AV PEAK GRADIENT: 10 MMHG
ECHO AV PEAK VELOCITY: 1.6 M/S
ECHO AV VTI: 27 CM
ECHO BSA: 1.64 M2
ECHO LA DIAMETER INDEX: 1.59 CM/M2
ECHO LA DIAMETER: 2.6 CM
ECHO LA VOL A-L A2C: 33 ML (ref 22–52)
ECHO LA VOL A-L A4C: 32 ML (ref 22–52)
ECHO LA VOL MOD A2C: 32 ML (ref 22–52)
ECHO LA VOL MOD A4C: 32 ML (ref 22–52)
ECHO LA VOLUME AREA LENGTH: 36 ML
ECHO LA VOLUME INDEX A-L A2C: 20 ML/M2 (ref 16–34)
ECHO LA VOLUME INDEX A-L A4C: 20 ML/M2 (ref 16–34)
ECHO LA VOLUME INDEX AREA LENGTH: 22 ML/M2 (ref 16–34)
ECHO LA VOLUME INDEX MOD A2C: 20 ML/M2 (ref 16–34)
ECHO LA VOLUME INDEX MOD A4C: 20 ML/M2 (ref 16–34)
ECHO LV EDV A2C: 52 ML
ECHO LV EDV A4C: 62 ML
ECHO LV EDV BP: 57 ML (ref 56–104)
ECHO LV EDV INDEX A4C: 38 ML/M2
ECHO LV EDV INDEX BP: 35 ML/M2
ECHO LV EDV NDEX A2C: 32 ML/M2
ECHO LV EJECTION FRACTION A2C: 73 %
ECHO LV EJECTION FRACTION A4C: 69 %
ECHO LV EJECTION FRACTION BIPLANE: 70 % (ref 55–100)
ECHO LV ESV A2C: 14 ML
ECHO LV ESV A4C: 19 ML
ECHO LV ESV BP: 17 ML (ref 19–49)
ECHO LV ESV INDEX A2C: 9 ML/M2
ECHO LV ESV INDEX A4C: 12 ML/M2
ECHO LV ESV INDEX BP: 10 ML/M2
ECHO LV FRACTIONAL SHORTENING: 45 % (ref 28–44)
ECHO LV INTERNAL DIMENSION DIASTOLE INDEX: 2.44 CM/M2
ECHO LV INTERNAL DIMENSION DIASTOLIC: 4 CM (ref 3.9–5.3)
ECHO LV INTERNAL DIMENSION SYSTOLIC INDEX: 1.34 CM/M2
ECHO LV INTERNAL DIMENSION SYSTOLIC: 2.2 CM
ECHO LV IVSD: 1 CM (ref 0.6–0.9)
ECHO LV IVSS: 1.6 CM
ECHO LV MASS 2D: 118.2 G (ref 67–162)
ECHO LV MASS INDEX 2D: 72.1 G/M2 (ref 43–95)
ECHO LV POSTERIOR WALL DIASTOLIC: 0.9 CM (ref 0.6–0.9)
ECHO LV POSTERIOR WALL SYSTOLIC: 1.4 CM
ECHO LV RELATIVE WALL THICKNESS RATIO: 0.45
ECHO LVOT AREA: 2.3 CM2
ECHO LVOT DIAM: 1.7 CM
ECHO MV "A" WAVE DURATION: 94.6 MSEC
ECHO MV A VELOCITY: 1.21 M/S
ECHO MV AREA PHT: 1.9 CM2
ECHO MV E DECELERATION TIME (DT): 398.5 MS
ECHO MV E VELOCITY: 0.82 M/S
ECHO MV E/A RATIO: 0.68
ECHO MV MAX VELOCITY: 1 M/S
ECHO MV MEAN GRADIENT: 2 MMHG
ECHO MV MEAN VELOCITY: 0.6 M/S
ECHO MV PEAK GRADIENT: 4 MMHG
ECHO MV PRESSURE HALF TIME (PHT): 118 MS
ECHO MV VTI: 32.6 CM
ECHO PVEIN A DURATION: 94.6 MS
ECHO PVEIN A VELOCITY: 0.3 M/S
ECHO PVEIN PEAK D VELOCITY: 0.4 M/S
ECHO PVEIN PEAK S VELOCITY: 0.6 M/S
ECHO PVEIN S/D RATIO: 1.5
ECHO RV INTERNAL DIMENSION: 2.6 CM
ECHO TV REGURGITANT MAX VELOCITY: 3.07 M/S
ECHO TV REGURGITANT PEAK GRADIENT: 38 MMHG
ERYTHROCYTE [DISTWIDTH] IN BLOOD BY AUTOMATED COUNT: 16.7 % (ref 11.5–15)
GFR, ESTIMATED: 74 ML/MIN/1.73M2
GLUCOSE SERPL-MCNC: 89 MG/DL (ref 74–99)
HCT VFR BLD AUTO: 31.7 % (ref 34–48)
HGB BLD-MCNC: 9.9 G/DL (ref 11.5–15.5)
MAGNESIUM SERPL-MCNC: 1.4 MG/DL (ref 1.6–2.6)
MCH RBC QN AUTO: 28.1 PG (ref 26–35)
MCHC RBC AUTO-ENTMCNC: 31.2 G/DL (ref 32–34.5)
MCV RBC AUTO: 90.1 FL (ref 80–99.9)
PHOSPHATE SERPL-MCNC: 2.8 MG/DL (ref 2.5–4.5)
PLATELET # BLD AUTO: 157 K/UL (ref 130–450)
PMV BLD AUTO: 11.8 FL (ref 7–12)
POTASSIUM SERPL-SCNC: 3.3 MMOL/L (ref 3.5–5)
PROT SERPL-MCNC: 4.8 G/DL (ref 6.4–8.3)
RBC # BLD AUTO: 3.52 M/UL (ref 3.5–5.5)
SODIUM SERPL-SCNC: 138 MMOL/L (ref 132–146)
WBC OTHER # BLD: 9.7 K/UL (ref 4.5–11.5)

## 2024-07-01 PROCEDURE — 84100 ASSAY OF PHOSPHORUS: CPT

## 2024-07-01 PROCEDURE — 99232 SBSQ HOSP IP/OBS MODERATE 35: CPT | Performed by: INTERNAL MEDICINE

## 2024-07-01 PROCEDURE — 85027 COMPLETE CBC AUTOMATED: CPT

## 2024-07-01 PROCEDURE — 2060000000 HC ICU INTERMEDIATE R&B

## 2024-07-01 PROCEDURE — 6360000002 HC RX W HCPCS: Performed by: HOSPITALIST

## 2024-07-01 PROCEDURE — 80053 COMPREHEN METABOLIC PANEL: CPT

## 2024-07-01 PROCEDURE — 83735 ASSAY OF MAGNESIUM: CPT

## 2024-07-01 PROCEDURE — 6370000000 HC RX 637 (ALT 250 FOR IP)

## 2024-07-01 PROCEDURE — C9113 INJ PANTOPRAZOLE SODIUM, VIA: HCPCS | Performed by: HOSPITALIST

## 2024-07-01 PROCEDURE — 6370000000 HC RX 637 (ALT 250 FOR IP): Performed by: INTERNAL MEDICINE

## 2024-07-01 PROCEDURE — 2580000003 HC RX 258: Performed by: HOSPITALIST

## 2024-07-01 PROCEDURE — 2580000003 HC RX 258: Performed by: INTERNAL MEDICINE

## 2024-07-01 PROCEDURE — 93306 TTE W/DOPPLER COMPLETE: CPT

## 2024-07-01 RX ADMIN — SUCRALFATE 1 G: 1 TABLET ORAL at 16:51

## 2024-07-01 RX ADMIN — SODIUM CHLORIDE, PRESERVATIVE FREE 40 MG: 5 INJECTION INTRAVENOUS at 11:00

## 2024-07-01 RX ADMIN — SODIUM CHLORIDE, PRESERVATIVE FREE 40 MG: 5 INJECTION INTRAVENOUS at 14:19

## 2024-07-01 RX ADMIN — SODIUM CHLORIDE, PRESERVATIVE FREE 40 MG: 5 INJECTION INTRAVENOUS at 01:11

## 2024-07-01 RX ADMIN — LORAZEPAM 1 MG: 1 TABLET ORAL at 21:01

## 2024-07-01 RX ADMIN — SERTRALINE 100 MG: 100 TABLET, FILM COATED ORAL at 10:59

## 2024-07-01 RX ADMIN — METOPROLOL SUCCINATE 25 MG: 25 TABLET, FILM COATED, EXTENDED RELEASE ORAL at 20:45

## 2024-07-01 RX ADMIN — METOPROLOL SUCCINATE 25 MG: 25 TABLET, FILM COATED, EXTENDED RELEASE ORAL at 10:59

## 2024-07-01 RX ADMIN — SUCRALFATE 1 G: 1 TABLET ORAL at 20:45

## 2024-07-01 RX ADMIN — SUCRALFATE 1 G: 1 TABLET ORAL at 06:03

## 2024-07-01 RX ADMIN — SODIUM CHLORIDE, POTASSIUM CHLORIDE, SODIUM LACTATE AND CALCIUM CHLORIDE: 600; 310; 30; 20 INJECTION, SOLUTION INTRAVENOUS at 04:47

## 2024-07-01 RX ADMIN — SODIUM CHLORIDE, PRESERVATIVE FREE 40 MG: 5 INJECTION INTRAVENOUS at 20:50

## 2024-07-01 NOTE — PROGRESS NOTES
[facility] Hospitalist   Progress Note    Admitting Date and Time: 6/27/2024  7:42 PM  Admit Dx: Metabolic acidosis [E87.20]  Atrial fibrillation with rapid ventricular response (HCC) [I48.91]  Abdominal pain, unspecified abdominal location [R10.9]  Gastrointestinal hemorrhage, unspecified gastrointestinal hemorrhage type [K92.2]  Atrial fibrillation, unspecified type (HCC) [I48.91]    Subjective:    Patient was admitted with Metabolic acidosis [E87.20]  Atrial fibrillation with rapid ventricular response (HCC) [I48.91]  Abdominal pain, unspecified abdominal location [R10.9]  Gastrointestinal hemorrhage, unspecified gastrointestinal hemorrhage type [K92.2]  Atrial fibrillation, unspecified type (HCC) [I48.91]. Patient denies fever, chills, cp, sob, n/v.     metoprolol succinate  25 mg Oral BID    sucralfate  1 g Oral 4x Daily AC & HS    sertraline  100 mg Oral Daily    sodium chloride flush  5-40 mL IntraVENous 2 times per day    sodium chloride flush  5-40 mL IntraVENous 2 times per day    pantoprazole (PROTONIX) 40 mg in sodium chloride (PF) 0.9 % 10 mL injection  40 mg IntraVENous Q6H    nicotine  1 patch TransDERmal Daily     LORazepam, 1 mg, Q12H PRN  sodium chloride, , PRN  sodium chloride flush, 5-40 mL, PRN  sodium chloride, , PRN  ondansetron, 4 mg, Q8H PRN   Or  ondansetron, 4 mg, Q6H PRN  acetaminophen, 650 mg, Q6H PRN   Or  acetaminophen, 650 mg, Q6H PRN  sodium chloride flush, 5-40 mL, PRN  sodium chloride, , PRN  perflutren lipid microspheres, 1.5 mL, ONCE PRN         Objective:    BP (!) 142/63   Pulse 60   Temp 98.9 °F (37.2 °C) (Axillary)   Resp 18   Ht 1.626 m (5' 4\")   Wt 60.2 kg (132 lb 11.5 oz)   SpO2 96%   BMI 22.78 kg/m²   Skin: warm and dry, no rash or erythema  Pulmonary/Chest: clear to auscultation bilaterally- no wheezes, rales or rhonchi, normal air movement, no respiratory distress  Cardiovascular: rhythm reg at rate of 64  Abdomen: soft, non-tender,

## 2024-07-01 NOTE — PROGRESS NOTES
SPIRITUAL HEALTH SERVICES - [facility]  PROGRESS NOTE    Name: Lizette Menendez                Pentecostalism: Sikhism   Anointed (Last Rites): No    Referral: Spiritual Care Consult    Assessment:  Upon entering the room  observes Patient sitting up in bed. Patient has heating pad beside her. Patient's Daughter is sitting on couch, working from laptop.      Intervention:  Patient requested ACP documents/information.  provided paperwork and spoke about filling out the documents. Patient stated she did not feel well enough to discuss information at this time.  explained to Patient that she could have RN call Chaplains who would come back and help Patient fill out documents at a later time.    Outcome:  Patient in agreement to fill out documents on her own or contact 's Office.    Plan:  Chaplains will remain available to offer spiritual and emotional support as needed.      Electronically signed by SULMA Tolliver, on 7/1/2024 at 1:52 PM.  Spiritual Care Department  [facility]  [phone]

## 2024-07-01 NOTE — PLAN OF CARE
Problem: Discharge Planning  Goal: Discharge to home or other facility with appropriate resources  6/30/2024 2254 by Giovanna Rodney RN  Outcome: Progressing  Flowsheets (Taken 6/30/2024 2000)  Discharge to home or other facility with appropriate resources: Identify barriers to discharge with patient and caregiver  6/30/2024 1147 by Pia Wolfe RN  Outcome: Progressing     Problem: Skin/Tissue Integrity  Goal: Absence of new skin breakdown  Description: 1.  Monitor for areas of redness and/or skin breakdown  2.  Assess vascular access sites hourly  3.  Every 4-6 hours minimum:  Change oxygen saturation probe site  4.  Every 4-6 hours:  If on nasal continuous positive airway pressure, respiratory therapy assess nares and determine need for appliance change or resting period.  6/30/2024 2254 by Giovanna Rodney RN  Outcome: Progressing  6/30/2024 1147 by Pia Wolfe RN  Outcome: Progressing     Problem: Safety - Adult  Goal: Free from fall injury  6/30/2024 2254 by Giovanna Rodney RN  Outcome: Progressing  6/30/2024 1147 by Pia Wolfe RN  Outcome: Progressing  Flowsheets (Taken 6/30/2024 1147)  Free From Fall Injury: Instruct family/caregiver on patient safety     Problem: Pain  Goal: Verbalizes/displays adequate comfort level or baseline comfort level  6/30/2024 2254 by Giovanna Rodney RN  Outcome: Progressing  6/30/2024 1147 by Pia Wolfe RN  Outcome: Progressing     Problem: Nutrition Deficit:  Goal: Optimize nutritional status  6/30/2024 2254 by Giovanna Rodney RN  Outcome: Progressing  6/30/2024 1147 by Pia Wolfe RN  Outcome: Progressing

## 2024-07-01 NOTE — PROGRESS NOTES
[facility] CARDIOLOGY PROGRESS NOTE  [facility]        Subjective: Patient admitted with GI bleeding and tachycardia with medical therapy: Converted back to normal sinus rhythm.    GI bleeding stabilized with scope and intervention.      Echo completed today normal LVEF 65%, mild concentric LVH    Objective: Medications lab chart telemetry all reviewed.    Patient Vitals for the past 24 hrs:   BP Temp Temp src Pulse Resp SpO2   07/01/24 1108 (!) 115/56 97.9 °F (36.6 °C) Oral 61 16 --   07/01/24 1045 (!) 110/55 (!) 49.8 °F (9.9 °C) Oral 93 18 96 %   07/01/24 0935 (!) 103/51 98.7 °F (37.1 °C) Oral 87 18 93 %   07/01/24 0300 (!) 110/59 98.9 °F (37.2 °C) Oral 80 18 92 %   07/01/24 0259 -- -- -- -- -- 99 %   06/30/24 2309 (!) 127/58 98.7 °F (37.1 °C) Oral 81 18 97 %   06/30/24 1915 (!) 109/55 97.9 °F (36.6 °C) Oral 77 18 97 %   06/30/24 1613 -- -- -- -- -- 93 %   06/30/24 1529 123/60 98.1 °F (36.7 °C) Oral 92 16 --         Intake/Output Summary (Last 24 hours) at 7/1/2024 1234  Last data filed at 6/30/2024 1423  Gross per 24 hour   Intake --   Output 500 ml   Net -500 ml       Wt Readings from Last 3 Encounters:   06/30/24 60.2 kg (132 lb 11.5 oz)   07/01/24 59.9 kg (132 lb)       Telemetry: Personally interpreted and today shows normal sinus rhythm heart rate in the 60s    Current meds: Scheduled Meds:   metoprolol succinate  25 mg Oral BID    sucralfate  1 g Oral 4x Daily AC & HS    sertraline  100 mg Oral Daily    sodium chloride flush  5-40 mL IntraVENous 2 times per day    sodium chloride flush  5-40 mL IntraVENous 2 times per day    pantoprazole (PROTONIX) 40 mg in sodium chloride (PF) 0.9 % 10 mL injection  40 mg IntraVENous Q6H    nicotine  1 patch TransDERmal Daily     Continuous Infusions:   sodium chloride      sodium chloride      sodium chloride 50 mL (06/30/24 0013)    lactated ringers IV soln 75 mL/hr at 07/01/24 0447     PRN Meds:.LORazepam, sodium chloride, sodium chloride flush, sodium chloride,  [de-identified] : At this time, due to pain in the left knee, the patient is going to continue physical therapy.  She will return to the office full duty and will return as needed.   ondansetron **OR** ondansetron, acetaminophen **OR** acetaminophen, sodium chloride flush, sodium chloride, perflutren lipid microspheres    Allergies: Patient has no known allergies.      Labs:   Recent Labs     06/29/24  0315 06/29/24  0800 06/30/24  0325 06/30/24  1033 07/01/24  0756   WBC 8.4  --  10.7  --  9.7   HGB 6.5*   < > 9.4* 9.2* 9.9*   HCT 20.7*  --  29.5*  --  31.7*   MCV 90.0  --  87.3  --  90.1   PLT  --   --   --   --  157    < > = values in this interval not displayed.       Labs:   Recent Labs     06/29/24  0506 06/29/24  1410 06/29/24  1615 06/30/24  0325 07/01/24  0756     --   --  140 138   K 3.2*   < > 4.4 3.7 3.3*   *  --   --  107 103   CO2 21*  --   --  24 26   PHOS 2.5  --   --  2.6 2.8   BUN 22  --   --  9 6   CREATININE 0.8  --   --  0.8 0.9    < > = values in this interval not displayed.       Labs: No results for input(s): \"CKTOTAL\", \"CKMB\", \"CKMBINDEX\", \"TROPONINI\" in the last 72 hours.    Labs: No results for input(s): \"BNP\" in the last 72 hours.    Labs: No results for input(s): \"CHOL\", \"HDL\", \"TRIG\" in the last 72 hours.    Invalid input(s): \"CHOLHDLR\", \"LDLCALCU\"    Labs: No results for input(s): \"PROT\", \"INR\" in the last 72 hours.    Review of systems: No reported significant weight gain or weight loss.  no dysuria or frequency, no dizziness, falls or trauma, no change in bowel or bladder habits, no hematochezia, hemoptysis or hematuria.  No fevers, chills, nausea or vomiting reported. No significant wheezing or sputum production.  No headache or visual changes.  The remainder of the 10 review of systems otherwise negative.                Exam      General: Patient comfortable in no distress and currently denies any chest pain.    HEENT: Face symmetrical and no apparent cranial nerve deficit.     Neck: No jugular venous distention, carotid bruit or thyromegaly.     Lungs: Clear bilaterally without rales, wheezes or dullness.      Cardiac: Regular rate and rhythm, no

## 2024-07-01 NOTE — PROGRESS NOTES
[facility] Quality Flow/Interdisciplinary Rounds Progress Note        Quality Flow Rounds held on July 1, 2024    Disciplines Attending:  Bedside Nurse, , , and Nursing Unit Leadership    Lizette Menendez was admitted on 6/27/2024  7:42 PM    Anticipated Discharge Date:  Expected Discharge Date: 07/01/24    Disposition:    Narayan Score:  Narayan Scale Score: 17    Readmission Risk              Risk of Unplanned Readmission:  12           Discussed patient goal for the day, patient clinical progression, and barriers to discharge.  The following Goal(s) of the Day/Commitment(s) have been identified:   echo, iv fluids, metoprolol, posisble d/c       Jovana Ledesma RN  July 1, 2024

## 2024-07-01 NOTE — CONSULTS
[facility]  Hematology/Oncology  Consult  Dr. Srinivas Tim M.D.    Patient Name: Lizette Menendez  YOB: 1955  PCP: Dann Prescott MD   Referring Provider:      Reason for Consultation:   Chief Complaint   Patient presents with    Abdominal Pain     Had ulcers cauterized yesterday. C/o abd pain with n/v and blood in stools.     Fatigue        History of Present Illness:  69 years old female came to the ER last week complaining of abdominal pain hematemesis.  She was also found to be in atrial fibrillation.  Labs on admission were remarkable for hemoglobin of 10.  Patient underwent repeat endoscopy on 28th.  Bleeding vessel was noted/treated by GI.  Chest x-ray showed a right lung nodule.  CT chest without contrast showed a right middle lobe 2 x 2.5 x 3.2 cm mass.  No bulky mediastinal lymphadenopathy was noted.  CT abdomen pelvis did not show any distant metastasis.  No recent weight loss loss of appetite.    Review of systems: Over 10 systems were reviewed and all were negative except as mention above.    Diagnostic Data:     No past medical history on file.    Patient Active Problem List    Diagnosis Date Noted    Atrial fibrillation with rapid ventricular response (HCC) 06/28/2024    Hypotension 06/28/2024    Acute blood loss anemia 06/28/2024    Lactic acidosis 06/28/2024    Gastrointestinal hemorrhage 06/28/2024        Past Surgical History:   Procedure Laterality Date    UPPER GASTROINTESTINAL ENDOSCOPY N/A 6/28/2024    ESOPHAGOGASTRODUODENOSCOP CONTROL HEMORRHAGE - APC & CLIPPING performed by Morris Corley DO at [facility] ENDOSCOPY       Family History  No family history on file.    Social History  Social History     Socioeconomic History    Marital status: Single     Spouse name: Not on file    Number of children: Not on file    Years of education: Not on file    Highest education level: Not on file   Occupational History    Not on file   Tobacco Use    Smoking status: Every Day        CO2 26 07/01/2024    BUN 6 07/01/2024    CREATININE 0.9 07/01/2024    GLUCOSE 89 07/01/2024    CALCIUM 8.1 (L) 07/01/2024    BILITOT 0.3 07/01/2024    ALKPHOS 41 07/01/2024    AST 14 07/01/2024    ALT 8 07/01/2024    LABGLOM 74 07/01/2024       No results found for: \"IRON\", \"TIBC\", \"FERRITIN\"        Radiology-    Vascular duplex upper extremity venous right   Final Result   1. No evidence of DVT.   2. Superficial vein thrombosis involving the basilic and cephalic veins.         CT CHEST WO CONTRAST   Final Result   1. Right middle lobe lobulated 2.6 x 3.2 x 1.8 cm mass concerning for   malignancy until proven otherwise with PET-CT and/or tissue sampling   recommended for further evaluation in the absence of prior cross-sectional   chest imaging.   2. COPD.   3. Nonobstructing right nephrolithiasis.      RECOMMENDATIONS:   Lung rads category 4B high a suspicion for malignancy PET-CT and/or tissue   sampling recommended in addition to a CT chest 3 months follow-up minimum         CT ABDOMEN PELVIS W IV CONTRAST Additional Contrast? None   Final Result   Possible thickening of the 2nd portion of the duodenum.      No evidence for visceral perforation.         XR CHEST PORTABLE   Final Result   1. No acute process.   2. 3.2 cm x 1.7 cm lobulated well-circumscribed nodule right mid lung. CT   chest recommended for further evaluation.               ASSESSMENT/PLAN :  69 years old female who with right middle lobe 3 x 2 x 2.5 cm mass.    Consistent with malignancy unless proven otherwise.    No significant mediastinal hilar lymphadenopathy or distant mets noted.  Clinically stage Ib.  Neoadjuvant systemic therapy not appropriate for stage Ib.  PET scan as an outpatient.  If negative for mediastinal lap or distant mets, she would benefit from cardiothoracic surgery evaluation for lobectomy.  Pulmonology on board- plan for [facility] as outpatient.   Patient is interested in seeing cardiothoracic surgery at [place]

## 2024-07-01 NOTE — PROGRESS NOTES
Juan Dejesus M.D.,[facility]  José Miguel Davis D.O., GRIS., [facility]  Micky Quinn M.D.  Debby Conley M.D.   JUDY YostO.      Daily Pulmonary Progress Note    Patient:  Lizette Menendez 69 y.o. female MRN: [id number]     Date of Service: 7/1/2024      Synopsis     We are following patient for lung nodule     \"CC\"  abdominal pain     Code status:  Full     Subjective      Patient was seen and examined.  Lying in bed on room air.   Denies dyspnea, cough, or wz  Feeling better  She is tearful today when discussing her lung mass    Review of Systems:  Constitutional: Denies fever, weight loss, night sweats, and fatigue  Skin: Denies pigmentation, dark lesions, and rashes   HEENT: Denies hearing loss, tinnitus, ear drainage, epistaxis, sore throat, and hoarseness.  Cardiovascular: Denies palpitations, chest pain, and chest pressure.  Respiratory: Denies cough, dyspnea at rest, hemoptysis, apnea, and choking.  Gastrointestinal: Denies nausea, vomiting, poor appetite, diarrhea, heartburn or reflux  Genitourinary: Denies dysuria, frequency, urgency or hematuria  Musculoskeletal: Denies myalgias, muscle weakness, and bone pain  Neurological: Denies dizziness, vertigo, headache, and focal weakness  Psychological: Tearful, Denies anxiety and depression  Endocrine: Denies heat intolerance and cold intolerance  Hematopoietic/Lymphatic: Denies bleeding problems and blood transfusions    24-hour events:  Tx out of Icu     Objective   OBJECTIVE:   BP (!) 142/63   Pulse 60   Temp 98.9 °F (37.2 °C) (Axillary)   Resp 18   Ht 1.626 m (5' 4\")   Wt 60.2 kg (132 lb 11.5 oz)   SpO2 96%   BMI 22.78 kg/m²   SpO2 Readings from Last 1 Encounters:   07/01/24 96%        I/O:  No intake or output data in the 24 hours ending 07/01/24 6166                     CURRENT MEDS :  Scheduled Meds:   metoprolol succinate  25 mg Oral BID    sucralfate  1 g Oral 4x Daily AC & HS    sertraline  100 mg Oral Daily    sodium chloride

## 2024-07-01 NOTE — PROGRESS NOTES
PROGRESS NOTE  By Allan Hoffman M.D.    [facility]  Dr. Flaquita Hernandez M.D.,  Dr. Carlos Devlin M.D.,   Dr. Morris Corley D.O.,  Dr. Davis Sloan M.D.,  JUDY DominguezO.,          Lizette Menendez  69 y.o.  female    SUBJECTIVE:  No new complaints.  Daughter at bedside    OBJECTIVE:    BP (!) 115/56   Pulse 61   Temp 97.9 °F (36.6 °C) (Oral)   Resp 16   Ht 1.626 m (5' 4\")   Wt 60.2 kg (132 lb 11.5 oz)   SpO2 96%   BMI 22.78 kg/m²     General: NAD/adult  female.  AAOx3  HEENT: Anicteric sclera/moist oral mucosa  Neck: Supple with trachea midline  Chest: Symmetric excursion/labored respirations  Cor: Regular  Abd.: Soft and nondistended/nontender  Extr.:  No peripheral edema  Skin: Warm and dry/anicteric      DATA:    Monitor data reviewed -sinus rhythm noted.       Lab Results   Component Value Date/Time    WBC 9.7 07/01/2024 07:56 AM    RBC 3.52 07/01/2024 07:56 AM    HGB 9.9 07/01/2024 07:56 AM    HCT 31.7 07/01/2024 07:56 AM    MCV 90.1 07/01/2024 07:56 AM    MCH 28.1 07/01/2024 07:56 AM    MCHC 31.2 07/01/2024 07:56 AM    RDW 16.7 07/01/2024 07:56 AM     07/01/2024 07:56 AM    MPV 11.8 07/01/2024 07:56 AM     Lab Results   Component Value Date/Time     07/01/2024 07:56 AM    K 3.3 07/01/2024 07:56 AM     07/01/2024 07:56 AM    CO2 26 07/01/2024 07:56 AM    BUN 6 07/01/2024 07:56 AM    CREATININE 0.9 07/01/2024 07:56 AM    CALCIUM 8.1 07/01/2024 07:56 AM    BILITOT 0.3 07/01/2024 07:56 AM    ALKPHOS 41 07/01/2024 07:56 AM    AST 14 07/01/2024 07:56 AM    ALT 8 07/01/2024 07:56 AM     Lab Results   Component Value Date/Time    LIPASE 14 06/27/2024 08:30 PM     No results found for: \"AMYLASE\"      ASSESSMENT/PLAN:  Patient Active Problem List   Diagnosis    Atrial fibrillation with rapid ventricular response (HCC)    Hypotension    Acute blood loss anemia    Lactic acidosis    Gastrointestinal hemorrhage     1.  Anemia/GI bleed  -Recent EGD with findings of

## 2024-07-02 VITALS
DIASTOLIC BLOOD PRESSURE: 63 MMHG | WEIGHT: 134 LBS | RESPIRATION RATE: 18 BRPM | HEART RATE: 73 BPM | TEMPERATURE: 100 F | SYSTOLIC BLOOD PRESSURE: 132 MMHG | OXYGEN SATURATION: 96 % | BODY MASS INDEX: 22.88 KG/M2 | HEIGHT: 64 IN

## 2024-07-02 LAB
ABO/RH: NORMAL
ALBUMIN SERPL-MCNC: 3 G/DL (ref 3.5–5.2)
ALP SERPL-CCNC: 46 U/L (ref 35–104)
ALT SERPL-CCNC: 9 U/L (ref 0–32)
ANION GAP SERPL CALCULATED.3IONS-SCNC: 8 MMOL/L (ref 7–16)
ANTIBODY SCREEN: NEGATIVE
ARM BAND NUMBER: NORMAL
AST SERPL-CCNC: 14 U/L (ref 0–31)
BILIRUB SERPL-MCNC: 0.2 MG/DL (ref 0–1.2)
BLOOD BANK BLOOD PRODUCT EXPIRATION DATE: NORMAL
BLOOD BANK DISPENSE STATUS: NORMAL
BLOOD BANK ISBT PRODUCT BLOOD TYPE: 6200
BLOOD BANK PRODUCT CODE: NORMAL
BLOOD BANK SAMPLE EXPIRATION: NORMAL
BLOOD BANK UNIT TYPE AND RH: NORMAL
BPU ID: NORMAL
BUN SERPL-MCNC: 6 MG/DL (ref 6–23)
CALCIUM SERPL-MCNC: 8.3 MG/DL (ref 8.6–10.2)
CHLORIDE SERPL-SCNC: 105 MMOL/L (ref 98–107)
CO2 SERPL-SCNC: 28 MMOL/L (ref 22–29)
COMPONENT: NORMAL
CREAT SERPL-MCNC: 0.8 MG/DL (ref 0.5–1)
CROSSMATCH RESULT: NORMAL
ERYTHROCYTE [DISTWIDTH] IN BLOOD BY AUTOMATED COUNT: 17.2 % (ref 11.5–15)
GFR, ESTIMATED: 83 ML/MIN/1.73M2
GLUCOSE SERPL-MCNC: 124 MG/DL (ref 74–99)
HCT VFR BLD AUTO: 27.6 % (ref 34–48)
HGB BLD-MCNC: 8.7 G/DL (ref 11.5–15.5)
MAGNESIUM SERPL-MCNC: 1.4 MG/DL (ref 1.6–2.6)
MCH RBC QN AUTO: 28.4 PG (ref 26–35)
MCHC RBC AUTO-ENTMCNC: 31.5 G/DL (ref 32–34.5)
MCV RBC AUTO: 90.2 FL (ref 80–99.9)
MICROORGANISM SPEC CULT: NORMAL
MICROORGANISM SPEC CULT: NORMAL
PHOSPHATE SERPL-MCNC: 3 MG/DL (ref 2.5–4.5)
PLATELET # BLD AUTO: 168 K/UL (ref 130–450)
PMV BLD AUTO: 11.6 FL (ref 7–12)
POTASSIUM SERPL-SCNC: 3.2 MMOL/L (ref 3.5–5)
PROT SERPL-MCNC: 4.8 G/DL (ref 6.4–8.3)
RBC # BLD AUTO: 3.06 M/UL (ref 3.5–5.5)
SERVICE CMNT-IMP: NORMAL
SERVICE CMNT-IMP: NORMAL
SODIUM SERPL-SCNC: 141 MMOL/L (ref 132–146)
SPECIMEN DESCRIPTION: NORMAL
SPECIMEN DESCRIPTION: NORMAL
TRANSFUSION STATUS: NORMAL
UNIT DIVISION: 0
UNIT ISSUE DATE/TIME: NORMAL
WBC OTHER # BLD: 8.5 K/UL (ref 4.5–11.5)

## 2024-07-02 PROCEDURE — 6370000000 HC RX 637 (ALT 250 FOR IP): Performed by: INTERNAL MEDICINE

## 2024-07-02 PROCEDURE — 97165 OT EVAL LOW COMPLEX 30 MIN: CPT

## 2024-07-02 PROCEDURE — 6360000002 HC RX W HCPCS: Performed by: HOSPITALIST

## 2024-07-02 PROCEDURE — 6360000002 HC RX W HCPCS: Performed by: INTERNAL MEDICINE

## 2024-07-02 PROCEDURE — 80053 COMPREHEN METABOLIC PANEL: CPT

## 2024-07-02 PROCEDURE — 97161 PT EVAL LOW COMPLEX 20 MIN: CPT

## 2024-07-02 PROCEDURE — 83735 ASSAY OF MAGNESIUM: CPT

## 2024-07-02 PROCEDURE — 85027 COMPLETE CBC AUTOMATED: CPT

## 2024-07-02 PROCEDURE — 84100 ASSAY OF PHOSPHORUS: CPT

## 2024-07-02 PROCEDURE — 36415 COLL VENOUS BLD VENIPUNCTURE: CPT

## 2024-07-02 PROCEDURE — 6370000000 HC RX 637 (ALT 250 FOR IP)

## 2024-07-02 PROCEDURE — 99239 HOSP IP/OBS DSCHRG MGMT >30: CPT | Performed by: INTERNAL MEDICINE

## 2024-07-02 PROCEDURE — 2580000003 HC RX 258: Performed by: HOSPITALIST

## 2024-07-02 RX ORDER — MAGNESIUM SULFATE IN WATER 40 MG/ML
2000 INJECTION, SOLUTION INTRAVENOUS ONCE
Status: COMPLETED | OUTPATIENT
Start: 2024-07-02 | End: 2024-07-02

## 2024-07-02 RX ORDER — CALCIUM CARBONATE 500 MG/1
500 TABLET, CHEWABLE ORAL 3 TIMES DAILY PRN
Status: DISCONTINUED | OUTPATIENT
Start: 2024-07-02 | End: 2024-07-02 | Stop reason: HOSPADM

## 2024-07-02 RX ORDER — POTASSIUM CHLORIDE 20 MEQ/1
40 TABLET, EXTENDED RELEASE ORAL ONCE
Status: COMPLETED | OUTPATIENT
Start: 2024-07-02 | End: 2024-07-02

## 2024-07-02 RX ADMIN — POTASSIUM CHLORIDE 40 MEQ: 1500 TABLET, EXTENDED RELEASE ORAL at 16:47

## 2024-07-02 RX ADMIN — MAGNESIUM SULFATE HEPTAHYDRATE 2000 MG: 40 INJECTION, SOLUTION INTRAVENOUS at 16:46

## 2024-07-02 RX ADMIN — SODIUM CHLORIDE, PRESERVATIVE FREE 40 MG: 5 INJECTION INTRAVENOUS at 09:36

## 2024-07-02 RX ADMIN — CALCIUM CARBONATE 500 MG: 500 TABLET, CHEWABLE ORAL at 14:05

## 2024-07-02 RX ADMIN — METOPROLOL SUCCINATE 25 MG: 25 TABLET, FILM COATED, EXTENDED RELEASE ORAL at 09:36

## 2024-07-02 RX ADMIN — LORAZEPAM 1 MG: 1 TABLET ORAL at 09:17

## 2024-07-02 RX ADMIN — SUCRALFATE 1 G: 1 TABLET ORAL at 06:00

## 2024-07-02 RX ADMIN — SODIUM CHLORIDE, PRESERVATIVE FREE 40 MG: 5 INJECTION INTRAVENOUS at 03:31

## 2024-07-02 RX ADMIN — SERTRALINE 100 MG: 100 TABLET, FILM COATED ORAL at 09:36

## 2024-07-02 RX ADMIN — SODIUM CHLORIDE, PRESERVATIVE FREE 40 MG: 5 INJECTION INTRAVENOUS at 15:17

## 2024-07-02 NOTE — PROGRESS NOTES
Physical Therapy  Facility/Department: [address] INTERMEDIATE  Physical Therapy Initial Assessment    Name: Lizette Menendez  : 1955  MRN: [id number]  Date of Service: 2024          Patient Diagnosis(es): The primary encounter diagnosis was Atrial fibrillation, unspecified type (HCC). Diagnoses of Abdominal pain, unspecified abdominal location, Gastrointestinal hemorrhage, unspecified gastrointestinal hemorrhage type, and Metabolic acidosis were also pertinent to this visit.  Past Medical History:  has no past medical history on file.  Past Surgical History:  has a past surgical history that includes Upper gastrointestinal endoscopy (N/A, 2024).         Requires PT Follow-Up: No     Evaluating Therapist: Iveth Gu PT     Referring Provider:      Maco Patel DO       PT order : PT eval and treat     Room #: 648  DIAGNOSIS: The primary encounter diagnosis was Atrial fibrillation, unspecified type (HCC). Diagnoses of Abdominal pain, unspecified abdominal location, Gastrointestinal hemorrhage, unspecified gastrointestinal hemorrhage type, and Metabolic acidosis were also pertinent to this visit.    PRECAUTIONS: falls      Social:  Pt lives alone  in a  1  floor plan  no  steps  to enter.  Prior to admission pt walked with  no AD , independent      Initial Evaluation  Date:  2024    Was pt agreeable to Eval/treatment?  Yes    Does pt have pain?  Denies    Bed Mobility  Rolling:  NT   Supine to sit:  independent   Sit to supine:  independent   Scooting:  independent    Transfers Sit to stand:  independent   Stand to sit:  independent   Stand pivot:  NT    Ambulation     400  feet with  no AD  with  independent        Stair negotiation: ascended and descended NT    LE ROM  WFL   LE strength  4+/ 5    AM- PAC RAW score             Pt is alert and Oriented      Balance: independent with gait, no LOB .     Endurance: WFL  Bed/Chair alarm: no      ASSESSMENT  Pt displays functional ability as noted in

## 2024-07-02 NOTE — PROGRESS NOTES
Occupational Therapy    OCCUPATIONAL THERAPY INITIAL EVALUATION    [facility]   [address], [place]         Date:2024                                                  Patient Name: Lizette Menendez    MRN: [id number]    : 1955    Room: [address] [place] [address]      Evaluating OT: Jolie Benitez OTR/L   [id number]      Referring Provider:Maco Patel DO     Specific Provider Orders/Date:OT eval and treat 2024      Diagnosis:  Metabolic acidosis [E87.20]  Atrial fibrillation with rapid ventricular response (HCC) [I48.91]  Abdominal pain, unspecified abdominal location [R10.9]  Gastrointestinal hemorrhage, unspecified gastrointestinal hemorrhage type [K92.2]  Atrial fibrillation, unspecified type (HCC) [I48.91]     Pertinent Medical History:  No past medical history on file.    Precautions:  Fall Risk,     COMMENTS:  eval only .  Patient able to manage own self care. No acute OT needs at this time      Home Living: Pt lives alone, 1 floor condo    Bathroom setup: shower     Prior Level of Function: Independent  with ADLs , and  with IADLs; ambulated with no no device     Pain Level: no pain this session ;   Cognition: A&O: pleasant, following commands, conversing    Memory:  good    Sequencing:  good    Problem solving:  good    Judgement/safety:  good      Functional Assessment:  AM-PAC Daily Activity Raw Score: 23/24   Initial Eval Status  Date: 2024   Feeding Independent    Grooming Independent    UB Dressing Independent    LB Dressing Mod I   Able to reach socks while seated EOB    Bathing Supervisin    Toileting Independent    Bed Mobility  Independent   Supine <> sit    Functional Transfers Independent  Sit- stand from bed    Functional Mobility Independent , no device   Ambulating in room    No LOB observed    Balance Sitting:     Static:  Independent     Dynamic:Independent   Standing: Independent    Activity Tolerance No SOB observed     Visual/  Perceptual Glasses: yes       UE ROM/strength  WFLS     Hand Dominance right    Hearing: WFL   Sensation:  No c/o numbness or tingling   Tone: WFL   Edema: none observed     Comments: Upon arrival patient lying in bed .  At end of session, patient returned to bed  with call light and phone within reach, all lines and tubes intact.       Patient / Family Goal: return home       Patient and/or family were instructed on functional diagnosis, prognosis/goals and OT plan of care. Demonstrated good  understanding.     Eval Complexity: Low    Time In: 0905  Time Out: 0915      Min Units   OT Eval Low 97165 x  1   OT Eval Medium 69311      OT Eval High 39118      OT Re-Eval 57957       Therapeutic Ex 06221      Therapeutic Activities 01032      ADL/Self Care 00175       Orthotic Management 23858       Manual 37742     Neuro Re-Ed 25872       Non-Billable Time      OT Re eval 77863        Evaluation Time additionally includes thorough review of current medical information, gathering information on past medical history/social history and prior level of function, interpretation of standardized testing/informal observation of tasks, assessment of data and development of plan of care and goals.            Jolie Benitez  OTR/L  OT [id number]

## 2024-07-02 NOTE — PROGRESS NOTES
Juan Dejesus M.D.,[facility]  José Miguel Davis D.O., GR[facility]., [facility]  Micky Quinn M.D.  Debby Conley M.D.   JUDY YostO.      Daily Pulmonary Progress Note    Patient:  Lizette Menendez 69 y.o. female MRN: [id number]     Date of Service: 7/2/2024      Synopsis     We are following patient for lung nodule     \"CC\"  abdominal pain     Code status:  Full     Subjective      Patient was seen and examined.  Lying in bed on room air.     Review of Systems:  Constitutional: Denies fever, weight loss, night sweats, and fatigue  Skin: Denies pigmentation, dark lesions, and rashes   HEENT: Denies hearing loss, tinnitus, ear drainage, epistaxis, sore throat, and hoarseness.  Cardiovascular: Denies palpitations, chest pain, and chest pressure.  Respiratory: Denies cough, dyspnea at rest, hemoptysis, apnea, and choking.  Gastrointestinal: Denies nausea, vomiting, poor appetite, diarrhea, heartburn or reflux  Genitourinary: Denies dysuria, frequency, urgency or hematuria  Musculoskeletal: Denies myalgias, muscle weakness, and bone pain  Neurological: Denies dizziness, vertigo, headache, and focal weakness  Psychological: Tearful, Denies anxiety and depression  Endocrine: Denies heat intolerance and cold intolerance  Hematopoietic/Lymphatic: Denies bleeding problems and blood transfusions    24-hour events:  Tx out of Icu     Objective   OBJECTIVE:   /60   Pulse 72   Temp 98.7 °F (37.1 °C)   Resp 16   Ht 1.626 m (5' 4\")   Wt 60.8 kg (134 lb)   SpO2 96%   BMI 23.00 kg/m²   SpO2 Readings from Last 1 Encounters:   07/02/24 96%        I/O:    Intake/Output Summary (Last 24 hours) at 7/2/2024 1108  Last data filed at 7/2/2024 0944  Gross per 24 hour   Intake 180 ml   Output --   Net 180 ml                        CURRENT MEDS :  Scheduled Meds:   metoprolol succinate  25 mg Oral BID    sertraline  100 mg Oral Daily    sodium chloride flush  5-40 mL IntraVENous 2 times per day    sodium chloride flush   conducted and revisions were made as appropriate. I agree with the above documented exam, problem list and plan of care with the following additions:    Henry bronch with mediastinal staging scheduled for 8/1/24.    Debby Conley MD

## 2024-07-02 NOTE — CARE COORDINATION
PT/OT am-pacs: 24/24. Plan remains for pt to return home where she is independent.   ROSS GanN, RN  [facility] Case Management  [phone]

## 2024-07-02 NOTE — PROGRESS NOTES
[facility] CARDIOLOGY PROGRESS NOTE  [facility]        Subjective: Seeing patient initially for tachycardia atrial fibrillation converted back to normal sinus rhythm, admitted with GI bleeding gastric ulcer intervened on    Echocardiogram completed yesterday reviewed with her today normal LVEF 65% mild concentric LVH.    She ate and ambulated in the room without difficulty.      Objective: Medications lab chart telemetry all reviewed.    Patient Vitals for the past 24 hrs:   BP Temp Temp src Pulse Resp SpO2 Height Weight   07/02/24 1143 133/60 98.8 °F (37.1 °C) Oral 90 18 -- -- --   07/02/24 0932 -- 98.7 °F (37.1 °C) -- 72 16 96 % -- --   07/02/24 0520 126/60 98.3 °F (36.8 °C) Oral 73 18 95 % -- 60.8 kg (134 lb)   07/01/24 2341 121/63 99 °F (37.2 °C) Oral 84 18 93 % -- --   07/01/24 2030 128/79 97.8 °F (36.6 °C) Oral 85 18 -- -- --   07/1955 -- -- -- -- -- -- 1.626 m (5' 4\") --   07/01/24 1521 (!) 142/63 98.9 °F (37.2 °C) Axillary 60 18 -- -- --         Intake/Output Summary (Last 24 hours) at 7/2/2024 1214  Last data filed at 7/2/2024 0944  Gross per 24 hour   Intake 180 ml   Output --   Net 180 ml       Wt Readings from Last 3 Encounters:   07/02/24 60.8 kg (134 lb)   07/01/24 59.9 kg (132 lb)       Telemetry: Personally interpreted and reviewed and shows normal sinus rhythm heart rate in the 70s and 80s.  No recurrence of AF    Current meds: Scheduled Meds:   metoprolol succinate  25 mg Oral BID    sertraline  100 mg Oral Daily    sodium chloride flush  5-40 mL IntraVENous 2 times per day    sodium chloride flush  5-40 mL IntraVENous 2 times per day    pantoprazole (PROTONIX) 40 mg in sodium chloride (PF) 0.9 % 10 mL injection  40 mg IntraVENous Q6H    nicotine  1 patch TransDERmal Daily     Continuous Infusions:   sodium chloride      sodium chloride      sodium chloride 50 mL (06/30/24 0013)    lactated ringers IV soln 75 mL/hr at 07/01/24 0447     PRN Meds:.LORazepam, sodium chloride, sodium  chloride flush, sodium chloride, ondansetron **OR** ondansetron, acetaminophen **OR** acetaminophen, sodium chloride flush, sodium chloride, perflutren lipid microspheres    Allergies: Patient has no known allergies.      Labs:   Recent Labs     06/30/24  0325 06/30/24  1033 07/01/24  0756   WBC 10.7  --  9.7   HGB 9.4* 9.2* 9.9*   HCT 29.5*  --  31.7*   MCV 87.3  --  90.1   PLT  --   --  157       Labs:   Recent Labs     06/29/24  1615 06/30/24  0325 07/01/24  0756   NA  --  140 138   K 4.4 3.7 3.3*   CL  --  107 103   CO2  --  24 26   PHOS  --  2.6 2.8   BUN  --  9 6   CREATININE  --  0.8 0.9       Labs: No results for input(s): \"CKTOTAL\", \"CKMB\", \"CKMBINDEX\", \"TROPONINI\" in the last 72 hours.    Labs: No results for input(s): \"BNP\" in the last 72 hours.    Labs: No results for input(s): \"CHOL\", \"HDL\", \"TRIG\" in the last 72 hours.    Invalid input(s): \"CHOLHDLR\", \"LDLCALCU\"    Labs: No results for input(s): \"INR\" in the last 72 hours.    Invalid input(s): \"PROT\"    Review of systems: No reported significant weight gain or weight loss.  no dysuria or frequency, no dizziness, falls or trauma, no change in bowel or bladder habits, no hematochezia, hemoptysis or hematuria.  No fevers, chills, nausea or vomiting reported. No significant wheezing or sputum production.  No headache or visual changes.  The remainder of the 10 review of systems otherwise negative.                Exam      General: Patient comfortable in no distress and currently denies any chest pain.    HEENT: Face symmetrical and no apparent cranial nerve deficit.     Neck: No jugular venous distention, carotid bruit or thyromegaly.     Lungs: Clear bilaterally without rales, wheezes or dullness.      Cardiac: Regular rate and rhythm, no S3, S4, no rub or gallop.       Abdomen: No rebound or guarding, no hepatosplenomegaly.    Extremities: Without significant clubbing , cyanosis, or edema.     Neuro:  No focal motor or sensory deficit apparent.    Skin:  No petechiae, no significant bruising.      Assessment: See plan below        Plan: #1 PAF currently normal sinus rhythm continue beta-blocker.  No plans for full anticoagulation.  With recent significant GI bleeding required multiple blood transfusions.    #2 gastric ulcer and now stabilized bleeding.  Hemoglobin stable.    #3 current smoker again stressed to her need to quit smoking.    #4 on nonsteroidal and excessive caffeine at home and over-the-counter vanquish asked her to stop this to reduce risk of atrial fibrillation and/or ulcers and bleeding.    Could be discharged to home today from cardiology viewpoint to be seen back [facility] cardiology 1 month.        Electronically signed by Vick Brown MD on 7/2/2024 at 12:14 PM

## 2024-07-02 NOTE — PROGRESS NOTES
Patient requested bed alarm waiver, waiver given.   Risks discussed with patient about not having bed alarm on.  Waiver placed in patient chart.    Electronically signed by Sabra Rees RN on 7/2/2024 at 11:36 AM

## 2024-07-02 NOTE — DISCHARGE SUMMARY
[facility] Hospitalist       Hospitalist Physician Discharge Summary       No follow-up provider specified.    Activity level: as marissa    Diet: ADULT DIET; Regular  ADULT ORAL NUTRITION SUPPLEMENT; Breakfast; Standard High Calorie/High Protein Oral Supplement  ADULT ORAL NUTRITION SUPPLEMENT; Dinner; Frozen Oral Supplement    Dispo:home    Condition at discharge: fair        Patient ID:  Lizette Menendez  [id number]  69 y.o.  1955    Admit date: 6/27/2024    Discharge date and time:  7/2/2024  4:51 PM    Admission Diagnoses: Principal Problem:    Atrial fibrillation with rapid ventricular response (HCC)  Active Problems:    Hypotension    Acute blood loss anemia    Lactic acidosis    Gastrointestinal hemorrhage    Lung mass    Gastric ulcer with hemorrhage    Atrial fibrillation (HCC)  Resolved Problems:    * No resolved hospital problems. *      Discharge Diagnoses: Principal Problem:    Atrial fibrillation with rapid ventricular response (HCC)  Active Problems:    Hypotension    Acute blood loss anemia    Lactic acidosis    Gastrointestinal hemorrhage    Lung mass    Gastric ulcer with hemorrhage    Atrial fibrillation (HCC)  Resolved Problems:    * No resolved hospital problems. *    Atrial fib   Gi bleed  Gastric ulcer with active bleed  Esophagitis   Lung mass  Hypokalemia   Hypomagnesemia  Acidosis  thrombocytopenia    Consults:  IP CONSULT TO GI  IP CONSULT TO CARDIOLOGY  IP CONSULT TO CRITICAL CARE  IP CONSULT TO SPIRITUAL SERVICES  IP CONSULT TO VASCULAR ACCESS TEAM  IP CONSULT TO VASCULAR ACCESS TEAM  IP CONSULT TO ONCOLOGY    Procedures: echo     Left Ventricle: Normal left ventricular systolic function with a visually estimated EF of 65 - 70%. Left ventricle size is normal. Findings consistent with mild concentric hypertrophy. Normal wall motion. Normal diastolic function.    Image quality is adequate.    ESOPHAGOGASTRODUODENOSCOPY   1.  LA A Reflux Esophagitis with no bleeding source seen    06/30/24  1033 07/01/24  0756 07/02/24  1258   WBC 10.7  --  9.7 8.5   RBC 3.38*  --  3.52 3.06*   HGB 9.4* 9.2* 9.9* 8.7*   HCT 29.5*  --  31.7* 27.6*   MCV 87.3  --  90.1 90.2   MCH 27.8  --  28.1 28.4   MCHC 31.9*  --  31.2* 31.5*   RDW 16.8*  --  16.7* 17.2*   PLT  --   --  157 168   MPV 11.2  --  11.8 11.6       No results for input(s): \"POCGLU\" in the last 72 hours.    CBC:   Lab Results   Component Value Date/Time    WBC 8.5 07/02/2024 12:58 PM    RBC 3.06 07/02/2024 12:58 PM    HGB 8.7 07/02/2024 12:58 PM    HCT 27.6 07/02/2024 12:58 PM    MCV 90.2 07/02/2024 12:58 PM    MCH 28.4 07/02/2024 12:58 PM    MCHC 31.5 07/02/2024 12:58 PM    RDW 17.2 07/02/2024 12:58 PM     07/02/2024 12:58 PM    MPV 11.6 07/02/2024 12:58 PM     CMP:    Lab Results   Component Value Date/Time     07/02/2024 12:58 PM    K 3.2 07/02/2024 12:58 PM     07/02/2024 12:58 PM    CO2 28 07/02/2024 12:58 PM    BUN 6 07/02/2024 12:58 PM    CREATININE 0.8 07/02/2024 12:58 PM    LABGLOM 83 07/02/2024 12:58 PM    GLUCOSE 124 07/02/2024 12:58 PM    CALCIUM 8.3 07/02/2024 12:58 PM    BILITOT 0.2 07/02/2024 12:58 PM    ALKPHOS 46 07/02/2024 12:58 PM    AST 14 07/02/2024 12:58 PM    ALT 9 07/02/2024 12:58 PM     Magnesium:    Lab Results   Component Value Date/Time    MG 1.4 07/02/2024 12:58 PM     Phosphorus:    Lab Results   Component Value Date/Time    PHOS 3.0 07/02/2024 12:58 PM       Imaging:   Vascular duplex upper extremity venous right   Final Result   1. No evidence of DVT.   2. Superficial vein thrombosis involving the basilic and cephalic veins.         CT CHEST WO CONTRAST   Final Result   1. Right middle lobe lobulated 2.6 x 3.2 x 1.8 cm mass concerning for   malignancy until proven otherwise with PET-CT and/or tissue sampling   recommended for further evaluation in the absence of prior cross-sectional   chest imaging.   2. COPD.   3. Nonobstructing right nephrolithiasis.      RECOMMENDATIONS:   Lung rads category 4B

## 2024-07-02 NOTE — PROGRESS NOTES
[facility] Quality Flow/Interdisciplinary Rounds Progress Note        Quality Flow Rounds held on July 2, 2024    Disciplines Attending:  Bedside Nurse, , , and Nursing Unit Leadership    Lizette Menendez was admitted on 6/27/2024  7:42 PM    Anticipated Discharge Date:  Expected Discharge Date: 07/01/24    Disposition:    Narayan Score:  Narayan Scale Score: 17    Readmission Risk              Risk of Unplanned Readmission:  12           Discussed patient goal for the day, patient clinical progression, and barriers to discharge.  The following Goal(s) of the Day/Commitment(s) have been identified:   iv fluids, metoprolol-no thinners, possible d/c       Jovana Ledesma RN  July 2, 2024

## 2024-07-02 NOTE — PROGRESS NOTES
Comprehensive Nutrition Assessment    Type and Reason for Visit:  Reassess    Nutrition Recommendations/Plan:   Continue diet per orders. Reviewed w/pt.  Modified ONS to pt preference-1 STD WENDI ONS, 1 frozen ONS each daily.  K+ & Mg low-continue too monitor & replete as needed as pt is at risk for refeeding syndrome.  Will monitor.     Malnutrition Assessment:  Malnutrition Status:  At risk for malnutrition (Comment) (06/28/24 7265)    Context:  Acute Illness     Findings of the 6 clinical characteristics of malnutrition:  Energy Intake:  50% or less of estimated energy requirements for 5 or more days  Weight Loss:  Unable to assess     Body Fat Loss:  Unable to assess     Muscle Mass Loss:  Unable to assess    Fluid Accumulation:  No significant fluid accumulation     Strength:  Not Performed    Nutrition Assessment:    Diet advanced to Regular diet. Pt states tolerating well. Tolerating  Standard ONS but now prefers Chocolate flavor. Will send per request but only once daily per request. Added frozen ONS once daily. Pt w/GIB, new pulmonary nodule found. Will monitor.    Nutrition Related Findings:    A& O x 4, soft/round abd, +2 RUE edema, K+ 3.3, Mg 1.4, PO4 2.8, IVF Wound Type: Skin Tears (small skin tear/blister on radial per flowsheet)       Current Nutrition Intake & Therapies:    Average Meal Intake: %  Average Supplements Intake: 26-50%  ADULT DIET; Regular  ADULT ORAL NUTRITION SUPPLEMENT; Breakfast; Standard High Calorie/High Protein Oral Supplement  ADULT ORAL NUTRITION SUPPLEMENT; Dinner; Frozen Oral Supplement    Anthropometric Measures:  Height: 162.6 cm (5' 4\")  Ideal Body Weight (IBW): 120 lbs (55 kg)       Current Body Weight: 59.9 kg (132 lb 1.6 oz), 110.1 % IBW. Weight Source: Bed Scale (7/2)  Current BMI (kg/m2): 22.7  Usual Body Weight:  (IVAN d/t lack of actual wt hx in EMR)     Weight Adjustment For: No Adjustment                 BMI Categories: Normal Weight (BMI 22.0 to 24.9) age  over 65    Estimated Daily Nutrient Needs:  Energy Requirements Based On: Formula  Weight Used for Energy Requirements: Current  Energy (kcal/day): 9840-2972  Weight Used for Protein Requirements: Current  Protein (g/day): 60-70  Method Used for Fluid Requirements: 1 ml/kcal  Fluid (ml/day): 6062-0928    Nutrition Diagnosis:   No nutrition diagnosis at this time      Nutrition Interventions:   Food and/or Nutrient Delivery: Continue Current Diet  Nutrition Education/Counseling: Education completed  Coordination of Nutrition Care: Continue to monitor while inpatient       Goals:  Previous Goal Met: Goal(s) Achieved  Goals: Meet at least 75% of estimated needs, by next RD assessment       Nutrition Monitoring and Evaluation:   Behavioral-Environmental Outcomes: None Identified  Food/Nutrient Intake Outcomes: Food and Nutrient Intake, Supplement Intake  Physical Signs/Symptoms Outcomes: Biochemical Data, Fluid Status or Edema    Discharge Planning:    Continue current diet     Minerva Palmer RD  Contact: [phone]

## 2024-07-03 LAB
EKG ATRIAL RATE: 170 BPM
EKG ATRIAL RATE: 90 BPM
EKG P AXIS: 64 DEGREES
EKG P-R INTERVAL: 128 MS
EKG Q-T INTERVAL: 274 MS
EKG Q-T INTERVAL: 364 MS
EKG QRS DURATION: 70 MS
EKG QRS DURATION: 72 MS
EKG QTC CALCULATION (BAZETT): 445 MS
EKG QTC CALCULATION (BAZETT): 458 MS
EKG R AXIS: 23 DEGREES
EKG R AXIS: 33 DEGREES
EKG T AXIS: -113 DEGREES
EKG T AXIS: -56 DEGREES
EKG VENTRICULAR RATE: 168 BPM
EKG VENTRICULAR RATE: 90 BPM

## 2024-07-03 RX ORDER — NICOTINE 21 MG/24HR
1 PATCH, TRANSDERMAL 24 HOURS TRANSDERMAL DAILY
Qty: 30 PATCH | Refills: 3 | Status: SHIPPED | OUTPATIENT
Start: 2024-07-03

## 2024-07-03 RX ORDER — SUCRALFATE 1 G/1
1 TABLET ORAL
Qty: 7 TABLET | Refills: 0 | Status: SHIPPED | OUTPATIENT
Start: 2024-07-03 | End: 2024-07-05

## 2024-07-03 RX ORDER — METOPROLOL SUCCINATE 25 MG/1
25 TABLET, EXTENDED RELEASE ORAL 2 TIMES DAILY
Qty: 30 TABLET | Refills: 3 | Status: SHIPPED | OUTPATIENT
Start: 2024-07-03

## 2024-07-05 NOTE — CARE COORDINATION
Received notification from [employer] on 07/03/2024 @ 9263 regarding pt filing appeal. Pt was discharged from hospital on 07/02/2024.   Clinical was uploaded to [employer] on 07/05/2024 @ 0833.    John Galvin, MSN, RN  Manager Care Coordination  [facility]

## 2024-07-08 NOTE — CARE COORDINATION
Received notification from [facility] on 7/6/24 @ 1012 that physician reviewer disagrees with termination of services.    John Galvin, MSN, RN  Manager Care Coordination  [facility]

## 2024-07-22 NOTE — PROGRESS NOTES
Physician Progress Note      PATIENT:               ELIDA SUÁREZ  [facility] #:                  [id number]  :                       1955  ADMIT DATE:       2024 7:42 PM  DISCH DATE:        2024 8:38 PM  RESPONDING  PROVIDER #:        Stanley Lebron MD          QUERY TEXT:    Patient admitted with GI hemorrhage and was noted to have trop of 15 up to 27.    If possible, please document in the progress notes and discharge summary if   you are evaluating and/or treating any of the following:    The medical record reflects the following:  Risk Factors: GI hemorrhage, anemia  Clinical Indicators: admitted with GI hemorrhage due to gastric ulcer, noted   to have troponin 15 which torie to 27.  EKG showed afib, marked ST abnormality   possible inferior subendocardial injury; consult states troponin 15-26 likely   II release due to anemia  Treatment: serial troponin levels, Card consult    Thank you,  Mike ELI, CCDS  [phone]  Options provided:  -- Type 2 MI due to anemia  -- Demand Ischemia due to anemia  -- Other - I will add my own diagnosis  -- Disagree - Not applicable / Not valid  -- Disagree - Clinically unable to determine / Unknown  -- Refer to Clinical Documentation Reviewer    PROVIDER RESPONSE TEXT:    This patient has a Type 2 MI due to anemia    Query created by: Mike Stafford on 2024 7:08 AM      Electronically signed by:  Stnaley Lebron MD 2024 2:13 PM

## 2025-02-11 ENCOUNTER — HOSPITAL ENCOUNTER (OUTPATIENT)
Age: 70
Discharge: HOME | End: 2025-02-11
Payer: MEDICARE

## 2025-02-11 LAB
25(OH)D3 SERPL-MCNC: 24.3 NG/ML (ref 30–100)
ALBUMIN SERPL-MCNC: 4.3 G/DL (ref 3.5–5.2)
ALP SERPL-CCNC: 66 U/L (ref 35–104)
ALT SERPL-CCNC: 9 U/L (ref 0–32)
ANION GAP SERPL CALCULATED.3IONS-SCNC: 10 MMOL/L (ref 7–16)
AST SERPL-CCNC: 13 U/L (ref 0–31)
BASOPHILS # BLD: 0.06 K/UL (ref 0–0.2)
BASOPHILS NFR BLD: 1 % (ref 0–2)
BILIRUB SERPL-MCNC: 0.2 MG/DL (ref 0–1.2)
BILIRUB UR QL STRIP: NEGATIVE
BUN SERPL-MCNC: 13 MG/DL (ref 6–23)
CALCIUM SERPL-MCNC: 8.9 MG/DL (ref 8.6–10.2)
CHLORIDE SERPL-SCNC: 101 MMOL/L (ref 98–107)
CHOLEST SERPL-MCNC: 198 MG/DL
CLARITY UR: CLEAR
CO2 SERPL-SCNC: 29 MMOL/L (ref 22–29)
COLOR UR: YELLOW
CREAT SERPL-MCNC: 1 MG/DL (ref 0.5–1)
EOSINOPHIL # BLD: 0.06 K/UL (ref 0.05–0.5)
EOSINOPHILS RELATIVE PERCENT: 1 % (ref 0–6)
EPI CELLS #/AREA URNS HPF: ABNORMAL /HPF
ERYTHROCYTE [DISTWIDTH] IN BLOOD BY AUTOMATED COUNT: 15.7 % (ref 11.5–15)
ERYTHROCYTE [SEDIMENTATION RATE] IN BLOOD BY WESTERGREN METHOD: 1 MM/HR (ref 0–20)
FERRITIN SERPL-MCNC: 11 NG/ML
GFR, ESTIMATED: 61 ML/MIN/1.73M2
GLUCOSE SERPL-MCNC: 89 MG/DL (ref 74–99)
GLUCOSE UR STRIP-MCNC: NEGATIVE MG/DL
HCT VFR BLD AUTO: 38.8 % (ref 34–48)
HDLC SERPL-MCNC: 61 MG/DL
HGB BLD-MCNC: 11.1 G/DL (ref 11.5–15.5)
HGB UR QL STRIP.AUTO: NEGATIVE
IMM GRANULOCYTES # BLD AUTO: <0.03 K/UL (ref 0–0.58)
IMM GRANULOCYTES NFR BLD: 0 % (ref 0–5)
IRON SATN MFR SERPL: 7 % (ref 15–50)
IRON SERPL-MCNC: 34 UG/DL (ref 37–145)
KETONES UR STRIP-MCNC: NEGATIVE MG/DL
LDLC SERPL CALC-MCNC: 108 MG/DL
LEUKOCYTE ESTERASE UR QL STRIP: NEGATIVE
LYMPHOCYTES NFR BLD: 1.41 K/UL (ref 1.5–4)
LYMPHOCYTES RELATIVE PERCENT: 23 % (ref 20–42)
MCH RBC QN AUTO: 25.8 PG (ref 26–35)
MCHC RBC AUTO-ENTMCNC: 28.6 G/DL (ref 32–34.5)
MCV RBC AUTO: 90.2 FL (ref 80–99.9)
MONOCYTES NFR BLD: 0.43 K/UL (ref 0.1–0.95)
MONOCYTES NFR BLD: 7 % (ref 2–12)
NEUTROPHILS NFR BLD: 68 % (ref 43–80)
NEUTS SEG NFR BLD: 4.21 K/UL (ref 1.8–7.3)
NITRITE UR QL STRIP: NEGATIVE
PH UR STRIP: 6 [PH] (ref 5–8)
PLATELET # BLD AUTO: 296 K/UL (ref 130–450)
PMV BLD AUTO: 11.1 FL (ref 7–12)
POTASSIUM SERPL-SCNC: 4.1 MMOL/L (ref 3.5–5)
PROT SERPL-MCNC: 7 G/DL (ref 6.4–8.3)
PROT UR STRIP-MCNC: ABNORMAL MG/DL
RBC # BLD AUTO: 4.3 M/UL (ref 3.5–5.5)
RBC # BLD: ABNORMAL 10*6/UL
RBC #/AREA URNS HPF: ABNORMAL /HPF
SODIUM SERPL-SCNC: 140 MMOL/L (ref 132–146)
SP GR UR STRIP: >1.03 (ref 1–1.03)
TIBC SERPL-MCNC: 470 UG/DL (ref 250–450)
TRIGL SERPL-MCNC: 143 MG/DL
TSH SERPL DL<=0.05 MIU/L-ACNC: 0.79 UIU/ML (ref 0.27–4.2)
UROBILINOGEN UR STRIP-ACNC: 0.2 EU/DL (ref 0–1)
VIT B12 SERPL-MCNC: 420 PG/ML (ref 211–946)
VLDLC SERPL CALC-MCNC: 29 MG/DL
WBC #/AREA URNS HPF: ABNORMAL /HPF
WBC OTHER # BLD: 6.2 K/UL (ref 4.5–11.5)

## 2025-02-11 PROCEDURE — 83550 IRON BINDING TEST: CPT

## 2025-02-11 PROCEDURE — 82043 UR ALBUMIN QUANTITATIVE: CPT

## 2025-02-11 PROCEDURE — 80061 LIPID PANEL: CPT

## 2025-02-11 PROCEDURE — 83540 ASSAY OF IRON: CPT

## 2025-02-11 PROCEDURE — 81001 URINALYSIS AUTO W/SCOPE: CPT

## 2025-02-11 PROCEDURE — 80053 COMPREHEN METABOLIC PANEL: CPT

## 2025-02-11 PROCEDURE — 85652 RBC SED RATE AUTOMATED: CPT

## 2025-02-11 PROCEDURE — 84443 ASSAY THYROID STIM HORMONE: CPT

## 2025-02-11 PROCEDURE — 82607 VITAMIN B-12: CPT

## 2025-02-11 PROCEDURE — 82306 VITAMIN D 25 HYDROXY: CPT

## 2025-02-11 PROCEDURE — 82728 ASSAY OF FERRITIN: CPT

## 2025-02-11 PROCEDURE — 85025 COMPLETE CBC W/AUTO DIFF WBC: CPT

## 2025-02-11 PROCEDURE — 36415 COLL VENOUS BLD VENIPUNCTURE: CPT

## 2025-02-11 PROCEDURE — 82570 ASSAY OF URINE CREATININE: CPT

## 2025-02-12 LAB
CREAT UR-MCNC: 355.7 MG/DL (ref 29–226)
MICROALBUMIN UR-MCNC: 36 MG/L (ref 0–19)
MICROALBUMIN/CREAT UR-RTO: 10 MCG/MG CREAT (ref 0–30)

## 2025-08-19 ENCOUNTER — HOSPITAL ENCOUNTER (OUTPATIENT)
Age: 70
Discharge: HOME | End: 2025-08-21

## 2025-08-19 PROCEDURE — 88305 TISSUE EXAM BY PATHOLOGIST: CPT

## 2025-08-22 LAB — SURGICAL PATHOLOGY REPORT: NORMAL

## (undated) DEVICE — SPONGE GZ W4XL4IN RAYON POLY CVR W/NONWOVEN FAB STRL 2/PK

## (undated) DEVICE — NEEDLE SCLERO L200CM OD0.51MM ID0.24MM SHTH DIA1.8MM LOK

## (undated) DEVICE — SUREFIT, DUAL DISPERSIVE ELECTRODE, CONTACT QUALITY MONITOR: Brand: SUREFIT

## (undated) DEVICE — FIAPC® PROBE W/ FILTER 2200 C OD 2.3MM/6.9FR; L 2.2M/7.2FT: Brand: ERBE

## (undated) DEVICE — WORKING LENGTH 155CM, WORKING CHANNEL 2.8MM: Brand: RESOLUTION 360 CLIP

## (undated) DEVICE — GRADUATE TRIANG MEASURE 1000ML BLK PRNT

## (undated) DEVICE — BLOCK BITE 60FR RUBBER ADLT DENTAL